# Patient Record
Sex: FEMALE | Race: WHITE | NOT HISPANIC OR LATINO | Employment: UNEMPLOYED | URBAN - METROPOLITAN AREA
[De-identification: names, ages, dates, MRNs, and addresses within clinical notes are randomized per-mention and may not be internally consistent; named-entity substitution may affect disease eponyms.]

---

## 2017-06-24 ENCOUNTER — OFFICE VISIT (OUTPATIENT)
Dept: URGENT CARE | Facility: MEDICAL CENTER | Age: 22
End: 2017-06-24

## 2017-06-24 PROCEDURE — G0382 LEV 3 HOSP TYPE B ED VISIT: HCPCS

## 2018-06-27 ENCOUNTER — APPOINTMENT (EMERGENCY)
Dept: ULTRASOUND IMAGING | Facility: HOSPITAL | Age: 23
End: 2018-06-27
Payer: COMMERCIAL

## 2018-06-27 ENCOUNTER — HOSPITAL ENCOUNTER (EMERGENCY)
Facility: HOSPITAL | Age: 23
Discharge: HOME | End: 2018-06-27
Attending: EMERGENCY MEDICINE
Payer: COMMERCIAL

## 2018-06-27 VITALS
OXYGEN SATURATION: 99 % | SYSTOLIC BLOOD PRESSURE: 119 MMHG | RESPIRATION RATE: 18 BRPM | TEMPERATURE: 97.8 F | HEART RATE: 76 BPM | WEIGHT: 150 LBS | DIASTOLIC BLOOD PRESSURE: 61 MMHG

## 2018-06-27 DIAGNOSIS — Z3A.20 20 WEEKS GESTATION OF PREGNANCY: Primary | ICD-10-CM

## 2018-06-27 PROCEDURE — 76857 US EXAM PELVIC LIMITED: CPT

## 2018-06-27 PROCEDURE — 99284 EMERGENCY DEPT VISIT MOD MDM: CPT

## 2018-06-27 NOTE — DISCHARGE INSTRUCTIONS
Pregnancy at 19 to 22 Weeks   None Listed: Practice bulletin no  175 summary: ultrasound in pregnancy  Obstet Gynecol 2016; 128(6):4077-6450  Martha Kulkarni T : Prenatal Care and Testing  In: Wing ALDRICH, ed  The 5 Minute Clinical Consult Standard 2015, 23rd ed  [address], 2014  [employer]  org: Your Baby's Development: The Second Trimester  [employer] [place] 2011  Available from URL: [email or web address]  printerview html  As accessed 2015-01-22  Kyle Carias VL : Prenatal Care  In: Merlin Juneau BY, Pelon RF, et al, eds  [facility] Obstetrics and Gynecology, 10th ed  [address], 2008 [employer]: Your pregnant body  March of [address] 2014  Available from URL: [email or web address]  [email or web address]  aspx  As accessed 2015-01-25  Nettina SM: Maternal and Fetal Health  In: Pedro Manual of Nursing Practice, 10th ed  [address], 2013  Yasmin Busch , Yaneth EW , & Ashlee Burciaga D : Preconception Counseling and Prenatal Care  In: Pavithra Archer MW, Natalie JL, et al, [facility] Gynecology and Obstetrics, 4th ed  [address], 2011  [facility] gov: Pregnancy  Office on [employer], [address] [facility] and [employer]  [place], [address]  Available from URL: Yokasta mar  html#a  As accessed 2015-01-25  [facility] gov: Pregnancy complications  Office on [facility] and [employer]  [place], [address]  Available from URL: Herbie montenegro  As accessed 2015-02-10    © 2017 [facility] Information is for End User's use only and may not be sold, redistributed or otherwise used for commercial purposes  All illustrations and images included in CareNotes® are the copyrighted property of A D A M , Inc  or Michael Smith  The above information is an  only  It is not intended as medical advice for individual conditions or treatments  Talk to your doctor, nurse or pharmacist before following any medical regimen to see if it is safe and effective for you  Pregnancy at 23 to 22 [address]:   Now that you are in your second trimester, you have more energy  You may also be feeling hungrier than usual  You may be gaining about ½ to 1 pound a week, and your pregnancy is beginning to show  You may need to start wearing maternity clothes  As your baby gets larger, you may have other symptoms  These may include body aches or stretch marks on your abdomen, breasts, thighs, or buttocks  DISCHARGE INSTRUCTIONS:   Seek care immediately if:   · You develop a severe headache that does not go away  · You have new or increased vision changes, such as blurred or spotted vision  · You have new or increased swelling in your face or hands  · You have vaginal spotting or bleeding  · Your water broke or you feel warm water gushing or trickling from your vagina  Contact your healthcare provider if:   · You have abdominal cramps, pressure, or tightening  · You have a change in vaginal discharge  · You cannot keep food or drinks down, and you are losing weight  · You have chills or a fever  · You have vaginal itching, burning, or pain  · You have yellow, green, white, or foul-smelling vaginal discharge  · You have pain or burning when you urinate, less urine than usual, or pink or bloody urine  · You have questions or concerns about your condition or care    How to care for yourself at this stage of your pregnancy:   · Eat a variety of healthy foods   Healthy foods include fruits, vegetables, whole-grain breads, low-fat dairy foods, beans, lean meats, and fish  Drink liquids as directed  Ask how much liquid to drink each day and which liquids are best for you  Limit caffeine to less than 200 milligrams each day  Limit your intake of fish to 2 servings each week  Choose fish low in mercury such as canned light tuna, shrimp, salmon, cod, or tilapia  Do not  eat fish high in mercury such as swordfish, tilefish, palmira mackerel, and shark  · Take prenatal vitamins as directed  Your need for certain vitamins and minerals, such as folic acid, increases during pregnancy  Prenatal vitamins provide some of the extra vitamins and minerals you need  Prenatal vitamins may also help to decrease the risk of certain birth defects  · Talk to your healthcare provider about exercise  Moderate exercise can help you stay fit  Your healthcare provider will help you plan an exercise program that is safe for you during pregnancy  · Do not smoke  If you smoke, it is never too late to quit  Smoking increases your risk of a miscarriage and other health problems during your pregnancy  Smoking can cause your baby to be born too early or weigh less at birth  Ask your healthcare provider for information if you need help quitting  · Do not drink alcohol  Alcohol passes from your body to your baby through the placenta  It can affect your baby's brain development and cause fetal alcohol syndrome (FAS)  FAS is a group of conditions that causes mental, behavior, and growth problems  · Talk to your healthcare provider before you take any medicines  Many medicines may harm your baby if you take them when you are pregnant  Do not take any medicines, vitamins, herbs, or supplements without first talking to your healthcare provider  Never use illegal or street drugs (such as marijuana or cocaine) while you are pregnant    Safety tips during pregnancy:   · Avoid hot tubs and saunas  Do not use a hot tub or sauna while you are pregnant, especially during your first trimester  Hot tubs and saunas may raise your baby's temperature and increase the risk of birth defects  · Avoid toxoplasmosis  This is an infection caused by eating raw meat or being around infected cat feces  It can cause birth defects, miscarriages, and other problems  Wash your hands after you touch raw meat  Make sure any meat is well-cooked before you eat it  Avoid raw eggs and unpasteurized milk  Use gloves or ask someone else to clean your cat's litter box while you are pregnant  Changes that are happening with your baby:  By 22 weeks, your baby is about 8 inches long from the top of the head to the rump (baby's bottom)  Your baby also weighs about 1 pound  Your baby is becoming much more active  You may be able to feel the baby move inside you now  The first movements may not be that noticeable  They may feel like a fluttering sensation  As time goes on, your baby's movements will become stronger and more noticeable  What you need to know about prenatal care:  During the first 28 weeks of your pregnancy, you will see your healthcare provider once a month  Your healthcare provider will check your blood pressure and weight  You may also need the following:  · A urine test  may also be done to check for sugar and protein  These can be signs of gestational diabetes or infection  Protein in your urine may also be a sign of preeclampsia  Preeclampsia is a condition that can develop during week 20 or later of your pregnancy  It causes high blood pressure, and it can cause problems with your kidneys and other organs  · Fundal height  is a measurement of your uterus to check your baby's growth  This number is usually the same as the number of weeks that you have been pregnant  · A fetal ultrasound  shows pictures of your baby inside your uterus  It shows your baby's development   The movement and position of your baby can also be seen  Your healthcare provider may be able to tell you what your baby's gender is during the ultrasound  · Your baby's heart rate  will be checked  © 2017 [address] Information is for End User's use only and may not be sold, redistributed or otherwise used for commercial purposes  All illustrations and images included in CareNotes® are the copyrighted property of A D A M , Inc  or Michael Smith  The above information is an  only  It is not intended as medical advice for individual conditions or treatments  Talk to your doctor, nurse or pharmacist before following any medical regimen to see if it is safe and effective for you

## 2018-06-27 NOTE — ED PROVIDER NOTES
History  Chief Complaint   Patient presents with    Abdominal Pain Pregnant     Pt here with lower abd pain  Estimating that she is about 4 months pregnant  Wants an US done so that her insurance can be approved but needs a due date  Denies any other symptoms  Patient presents to the emergency department for evaluation of lower abdominal pain with a history being 4 months pregnant  She has not had any pre needle care  She denies vaginal bleeding or discharge  Denies back pain  Denies contractions  Denies trauma fall or injury  Denies urinary symptoms  Denies fever nausea vomiting diarrhea  None       History reviewed  No pertinent past medical history  History reviewed  No pertinent surgical history  History reviewed  No pertinent family history  I have reviewed and agree with the history as documented  Social History   Substance Use Topics    Smoking status: Current Every Day Smoker    Smokeless tobacco: Never Used    Alcohol use No        Review of Systems   Constitutional: Negative  Negative for activity change, appetite change, chills, diaphoresis, fatigue and fever  HENT: Negative  Negative for congestion, drooling, sinus pain, sinus pressure, trouble swallowing and voice change  Eyes: Negative  Negative for photophobia and visual disturbance  Respiratory: Negative  Negative for cough, choking, chest tightness, shortness of breath and stridor  Cardiovascular: Negative  Negative for chest pain, palpitations and leg swelling  Gastrointestinal: Positive for abdominal pain  Negative for abdominal distention, anal bleeding, blood in stool, constipation, diarrhea, nausea and vomiting  Endocrine: Negative  Genitourinary: Negative  Negative for difficulty urinating, dyspareunia, dysuria, flank pain, frequency, hematuria, pelvic pain, urgency, vaginal bleeding, vaginal discharge and vaginal pain  Musculoskeletal: Negative    Negative for back pain, neck pain and neck stiffness  Skin: Negative  Negative for rash and wound  Allergic/Immunologic: Negative  Neurological: Negative  Negative for dizziness, tremors, seizures, syncope, facial asymmetry, speech difficulty, light-headedness, numbness and headaches  Hematological: Negative  Does not bruise/bleed easily  Psychiatric/Behavioral: Negative  Negative for agitation, behavioral problems, confusion, decreased concentration, self-injury, sleep disturbance and suicidal ideas  Physical Exam  Physical Exam   Constitutional: She is oriented to person, place, and time  She appears well-developed and well-nourished  Nontoxic appearance  No respiratory distress  Patient looks comfortable sitting upright in the stretcher  HENT:   Head: Normocephalic and atraumatic  Right Ear: External ear normal    Left Ear: External ear normal    Mouth/Throat: Oropharynx is clear and moist    Eyes: Conjunctivae and EOM are normal  Pupils are equal, round, and reactive to light  Neck: Normal range of motion  Neck supple  Cardiovascular: Normal rate, regular rhythm, normal heart sounds and intact distal pulses  Pulmonary/Chest: Effort normal and breath sounds normal  No respiratory distress  She has no wheezes  She has no rales  She exhibits no tenderness  Abdominal: Soft  Bowel sounds are normal  She exhibits no distension and no mass  There is no tenderness  There is no rebound and no guarding  No hernia  Protuberant abdomen consistent with intrauterine pregnancy  Musculoskeletal: Normal range of motion  She exhibits no edema, tenderness or deformity  Neurological: She is alert and oriented to person, place, and time  She has normal reflexes  She displays normal reflexes  No cranial nerve deficit or sensory deficit  She exhibits normal muscle tone  Coordination normal    Skin: Skin is warm and dry  Psychiatric: She has a normal mood and affect   Her behavior is normal  Judgment and thought content normal    Nursing note and vitals reviewed  Vital Signs  ED Triage Vitals [06/27/18 1633]   Temperature Pulse Respirations Blood Pressure SpO2   97 8 °F (36 6 °C) 76 18 119/61 99 %      Temp Source Heart Rate Source Patient Position - Orthostatic VS BP Location FiO2 (%)   Oral Monitor Sitting Right arm --      Pain Score       1           Vitals:    06/27/18 1633   BP: 119/61   Pulse: 76   Patient Position - Orthostatic VS: Sitting       Visual Acuity      ED Medications  Medications - No data to display    Diagnostic Studies  Results Reviewed     None                 US pelvis limited non OB   Final Result by Krissy Alvarado MD (06/27 1930)   1  Unremarkable appearance to the ovaries  2   Incomplete evaluation of intrauterine gestation  An OB US study may be ordered if there is clinical concern  Workstation performed: [id number]                    Procedures  Procedures       Phone Contacts  ED Phone Contact    ED Course  ED Course as of Jun 27 1932   Wed Jun 27, 2018   14[address] [facility] Patient is stable for discharge  So she has an ultrasound done in the emergency department which unofficially shows normal ovaries and a 20 week gestation with good fetal activity  IV she will be referred to private OBGYN                                 MDM  CritCare Time    Disposition  Final diagnoses:   20 weeks gestation of pregnancy     Time reflects when diagnosis was documented in both MDM as applicable and the Disposition within this note     Time User Action Codes Description Comment    6/27/2018  7:12 PM LopezKun bai Feeling Add [Z3A 20] 20 weeks gestation of pregnancy       ED Disposition     ED Disposition Condition Comment    Discharge  Jazmyneelisabet Yash discharge to home/self care      Condition at discharge: Stable        Follow-up Information     Follow up With Specialties Details [address] Obstetrics and Gynecology Schedule an appointment as soon as possible for a visit  [address] [address]  [phone]          Patient's Medications    No medications on file     No discharge procedures on file      ED Provider  Electronically Signed by           Candelario John MD  06/27/18 7031  [address]  06/27/18 4876

## 2018-08-18 ENCOUNTER — OFFICE VISIT (OUTPATIENT)
Dept: URGENT CARE | Facility: MEDICAL CENTER | Age: 23
End: 2018-08-18
Payer: COMMERCIAL

## 2018-08-18 VITALS
HEART RATE: 78 BPM | HEIGHT: 62 IN | RESPIRATION RATE: 18 BRPM | DIASTOLIC BLOOD PRESSURE: 58 MMHG | TEMPERATURE: 97.9 F | SYSTOLIC BLOOD PRESSURE: 122 MMHG | BODY MASS INDEX: 30.18 KG/M2 | OXYGEN SATURATION: 100 % | WEIGHT: 164 LBS

## 2018-08-18 DIAGNOSIS — J06.9 UPPER RESPIRATORY TRACT INFECTION, UNSPECIFIED TYPE: Primary | ICD-10-CM

## 2018-08-18 PROCEDURE — 99283 EMERGENCY DEPT VISIT LOW MDM: CPT | Performed by: PHYSICIAN ASSISTANT

## 2018-08-18 PROCEDURE — G0382 LEV 3 HOSP TYPE B ED VISIT: HCPCS | Performed by: PHYSICIAN ASSISTANT

## 2018-08-18 NOTE — PROGRESS NOTES
[facility]        NAME: Kyle Guidry is a 26 y o  female  : 1995    MRN: [id number]  DATE: 2018  TIME: 12:17 PM    Assessment and Plan   Upper respiratory tract infection, unspecified type [J06 9]  1  Upper respiratory tract infection, unspecified type           Patient Instructions     1  Tylenol as needed if febrile  2  Increase fluids  3  Nasal saline washes as needed for congestion  4  Follow up with PCP in 3-5 days if symptoms persist       Chief Complaint     Chief Complaint   Patient presents with    Cold Like Symptoms         History of Present Illness       The patient is a 72-year-old female presents with a 2 day history of nasal discharge, coughing congestion  She denies any fever but has had some chills since the onset of her symptoms  Patient denies any vomiting or diarrhea  Review of Systems   Review of Systems   Constitutional: Negative  HENT: Positive for congestion, postnasal drip and rhinorrhea  Respiratory: Positive for cough  Gastrointestinal: Negative  Current Medications       Current Outpatient Prescriptions:     Prenatal Multivit-Min-Fe-FA (PRENATAL VITAMINS PO), Take by mouth, Disp: , Rfl:     Current Allergies     Allergies as of 2018 - Reviewed 2018   Allergen Reaction Noted    Penicillins  2018            The following portions of the patient's history were reviewed and updated as appropriate: allergies, current medications, past family history, past medical history, past social history, past surgical history and problem list      No past medical history on file  No past surgical history on file  No family history on file  Medications have been verified          Objective   /58   Pulse 78   Temp 97 9 °F (36 6 °C) (Temporal)   Resp 18   Ht 5' 2" (1 575 m)   Wt 74 4 kg (164 lb)   LMP  (LMP Unknown)   SpO2 100%   BMI 30 00 kg/m²        Physical Exam     Physical Exam   Constitutional: She appears well-developed and well-nourished  No distress  HENT:   Head: Normocephalic and atraumatic  Right Ear: Tympanic membrane normal    Left Ear: Tympanic membrane normal    Nose: Rhinorrhea present  Mouth/Throat: Uvula is midline, oropharynx is clear and moist and mucous membranes are normal    Cardiovascular: Normal rate, regular rhythm and normal heart sounds  No murmur heard    Pulmonary/Chest: Effort normal and breath sounds normal

## 2018-08-18 NOTE — PATIENT INSTRUCTIONS
1  Tylenol as needed if febrile  2  Increase fluids  3  Nasal saline washes as needed for congestion  4   Follow up with PCP in 3-5 days if symptoms persist

## 2019-05-24 ENCOUNTER — OFFICE VISIT (OUTPATIENT)
Dept: URGENT CARE | Facility: MEDICAL CENTER | Age: 24
End: 2019-05-24
Payer: COMMERCIAL

## 2019-05-24 VITALS
WEIGHT: 190 LBS | HEART RATE: 93 BPM | DIASTOLIC BLOOD PRESSURE: 74 MMHG | TEMPERATURE: 98.2 F | OXYGEN SATURATION: 100 % | HEIGHT: 63 IN | BODY MASS INDEX: 33.66 KG/M2 | RESPIRATION RATE: 18 BRPM | SYSTOLIC BLOOD PRESSURE: 109 MMHG

## 2019-05-24 DIAGNOSIS — R21 RASH: Primary | ICD-10-CM

## 2019-05-24 PROCEDURE — 99283 EMERGENCY DEPT VISIT LOW MDM: CPT | Performed by: PHYSICIAN ASSISTANT

## 2019-05-24 PROCEDURE — 99213 OFFICE O/P EST LOW 20 MIN: CPT | Performed by: PHYSICIAN ASSISTANT

## 2019-05-24 PROCEDURE — G0382 LEV 3 HOSP TYPE B ED VISIT: HCPCS | Performed by: PHYSICIAN ASSISTANT

## 2019-05-24 RX ORDER — PREDNISONE 20 MG/1
40 TABLET ORAL DAILY
Qty: 10 TABLET | Refills: 0 | Status: SHIPPED | OUTPATIENT
Start: 2019-05-24 | End: 2019-05-29

## 2019-08-06 ENCOUNTER — OFFICE VISIT (OUTPATIENT)
Dept: URGENT CARE | Facility: MEDICAL CENTER | Age: 24
End: 2019-08-06
Payer: COMMERCIAL

## 2019-08-06 VITALS
WEIGHT: 199 LBS | OXYGEN SATURATION: 98 % | SYSTOLIC BLOOD PRESSURE: 122 MMHG | RESPIRATION RATE: 18 BRPM | HEART RATE: 80 BPM | TEMPERATURE: 96.8 F | BODY MASS INDEX: 36.62 KG/M2 | HEIGHT: 62 IN | DIASTOLIC BLOOD PRESSURE: 78 MMHG

## 2019-08-06 DIAGNOSIS — R21 SKIN RASH: Primary | ICD-10-CM

## 2019-08-06 PROCEDURE — 99283 EMERGENCY DEPT VISIT LOW MDM: CPT | Performed by: PHYSICIAN ASSISTANT

## 2019-08-06 PROCEDURE — G0382 LEV 3 HOSP TYPE B ED VISIT: HCPCS | Performed by: PHYSICIAN ASSISTANT

## 2019-08-06 PROCEDURE — 99213 OFFICE O/P EST LOW 20 MIN: CPT | Performed by: PHYSICIAN ASSISTANT

## 2019-08-06 RX ORDER — CLOTRIMAZOLE AND BETAMETHASONE DIPROPIONATE 10; .64 MG/G; MG/G
CREAM TOPICAL 2 TIMES DAILY
Qty: 30 G | Refills: 0 | Status: SHIPPED | OUTPATIENT
Start: 2019-08-06 | End: 2022-07-11

## 2019-08-06 RX ORDER — PREDNISONE 10 MG/1
TABLET ORAL
Qty: 30 TABLET | Refills: 0 | Status: SHIPPED | OUTPATIENT
Start: 2019-08-06 | End: 2019-08-16

## 2019-08-06 NOTE — PROGRESS NOTES
[email or web address]        NAME: Rafael Zavala is a 27 y o  female  : 1995    MRN: [id number]  DATE: 2019  TIME: 10:53 AM    Assessment and Plan   Skin rash [R21]  1  Skin rash  clotrimazole-betamethasone (LOTRISONE) 1-0 05 % cream    predniSONE 10 mg tablet         Patient Instructions     Discussed condition with patient  She has an inflammatory appearing rash but given its appearance and the fact that she has had for least 2 months, there may be a fungal component  It does not appear bacterial   I will treat her with a combination of an oral prednisone taper as well as Lotrisone cream   If not significantly improved over the next 1-2 weeks, then she should be re-evaluated  Follow up with PCP in 3-5 days  Proceed to  ER if symptoms worsen  Chief Complaint     Chief Complaint   Patient presents with    Rash     started 2 months ago with a rash , itching and irritation has resolved but still visible (denies any other sx)         History of Present Illness       Patient presents which she reports is a 2 month history of a diffuse patchy rash which at this point is persistent but not symptomatic  When it first emerged, she was having some itching and irritation  She has tried with over-the-counter topical preparations without success  Denies changing any foods, meds, personal skin care products, detergents  She is otherwise feeling well without any other symptoms or complaints  Review of Systems   Review of Systems   Constitutional: Negative  Respiratory: Negative  Cardiovascular: Negative  Gastrointestinal: Negative  Genitourinary: Negative  Skin: Positive for rash  Current Medications       Current Outpatient Medications:     clotrimazole-betamethasone (LOTRISONE) 1-0 05 % cream, Apply topically 2 (two) times a day, Disp: 30 g, Rfl: 0    predniSONE 10 mg tablet, 2-9-7-4-3-3-2-2-1-1 taper with food  , Disp: 30 tablet, Rfl: 0    Prenatal Multivit-Min-Fe-FA (PRENATAL VITAMINS PO), Take by mouth, Disp: , Rfl:     Current Allergies     Allergies as of 2019 - Reviewed 2019   Allergen Reaction Noted    Penicillins  2018            The following portions of the patient's history were reviewed and updated as appropriate: allergies, current medications, past family history, past medical history, past social history, past surgical history and problem list      History reviewed  No pertinent past medical history  Past Surgical History:   Procedure Laterality Date     SECTION         Family History   Problem Relation Age of Onset    No Known Problems Mother     No Known Problems Father          Medications have been verified  Objective   /78   Pulse 80   Temp (!) 96 8 °F (36 °C) (Temporal)   Resp 18   Ht 5' 2" (1 575 m)   Wt 90 3 kg (199 lb)   SpO2 98%   BMI 36 40 kg/m²        Physical Exam     Physical Exam   Constitutional: She is oriented to person, place, and time  She appears well-developed and well-nourished  No distress  Neurological: She is alert and oriented to person, place, and time  Skin: Rash (Diffuse scattered patchy mildly erythematous scaly maculopapular rash in a nonspecific distribution  Appears inflammatory) noted  Psychiatric: She has a normal mood and affect  Vitals reviewed

## 2019-08-06 NOTE — PATIENT INSTRUCTIONS
Acute Rash   WHAT YOU NEED TO KNOW:   A rash is irritation, redness, or itchiness in the skin or mucus membranes  Mucus membranes are areas such as the lining of your nose or throat  Acute means the rash starts suddenly, worsens quickly, and lasts a short time  An acute rash may be caused by a disease, such as hepatitis or vasculitis  The rash may be a reaction to something you are allergic to, such as certain foods, or latex  Certain medicines, including antibiotics, NSAIDs, prescription pain medicine, and aspirin can also cause a rash  DISCHARGE INSTRUCTIONS:   Return to the emergency department if:   · You have sudden trouble breathing or chest pain  · You are vomiting, have a headache or muscle aches, and your throat hurts  Contact your healthcare provider if:   · You have a fever  · You get open wounds from scratching your skin, or you have a wound that is red, swollen, or painful  · Your rash lasts longer than 3 months  · You have swelling or pain in your joints  · You have questions or concerns about your condition or care  Medicines:  If your rash does not go away on its own, you may need the following medicines:  · Antihistamines  may be given to help decrease itching  · Steroids  may be given to decrease inflammation  · Antibiotics  help fight or prevent a bacterial infection  · Take your medicine as directed  Contact your healthcare provider if you think your medicine is not helping or if you have side effects  Tell him of her if you are allergic to any medicine  Keep a list of the medicines, vitamins, and herbs you take  Include the amounts, and when and why you take them  Bring the list or the pill bottles to follow-up visits  Carry your medicine list with you in case of an emergency  Prevent a rash or care for your skin when you have a rash:  Dry skin can lead to more problems  Do not scratch your skin if it itches  You may cause a skin infection by scratching   The following may prevent dry skin, and help your skin look better:  · Use thick cream lotions or petroleum jelly to help soothe your rash  These products work well on areas with thick skin, such as your feet  Cool compresses may also be used to soothe your skin  Apply a cool compress or a cool, wet towel, and then cover it with a dry towel  · Use lukewarm water when you bathe  Hot water may damage your skin more  Pat your skin dry  Do not rub your skin with a towel  · Use detergents, soaps, shampoos, and bubble baths made for sensitive skin  Wear clothes that are made of cotton instead of nylon or wool  Cotton is softer, so it will not hurt your skin as much  Follow up with your healthcare provider as directed: You may need to see a dermatologist if healthcare providers do not know what is causing your rash  You may also need to see a dermatologist if your rash does not get better even with treatment  You may need to see a dietitian if you have allergies to foods  Write down your questions so you remember to ask them during your visits  © 2017 [address] [facility] Information is for End User's use only and may not be sold, redistributed or otherwise used for commercial purposes  All illustrations and images included in CareNotes® are the copyrighted property of A D A [employer]  or Michael Smith  The above information is an  only  It is not intended as medical advice for individual conditions or treatments  Talk to your doctor, nurse or pharmacist before following any medical regimen to see if it is safe and effective for you

## 2022-07-11 ENCOUNTER — TELEPHONE (OUTPATIENT)
Dept: OBGYN CLINIC | Facility: CLINIC | Age: 27
End: 2022-07-11

## 2022-07-11 ENCOUNTER — OFFICE VISIT (OUTPATIENT)
Dept: OBGYN CLINIC | Facility: CLINIC | Age: 27
End: 2022-07-11
Payer: COMMERCIAL

## 2022-07-11 VITALS
SYSTOLIC BLOOD PRESSURE: 118 MMHG | HEIGHT: 62 IN | DIASTOLIC BLOOD PRESSURE: 76 MMHG | WEIGHT: 197 LBS | BODY MASS INDEX: 36.25 KG/M2

## 2022-07-11 DIAGNOSIS — N93.9 ABNORMAL UTERINE BLEEDING: Primary | ICD-10-CM

## 2022-07-11 PROCEDURE — 99203 OFFICE O/P NEW LOW 30 MIN: CPT | Performed by: OBSTETRICS & GYNECOLOGY

## 2022-07-11 RX ORDER — NORETHINDRONE ACETATE AND ETHINYL ESTRADIOL 1; .02 MG/1; MG/1
1 TABLET ORAL DAILY
Qty: 84 TABLET | Refills: 4 | Status: SHIPPED | OUTPATIENT
Start: 2022-07-11

## 2022-07-11 RX ORDER — NORETHINDRONE ACETATE/ETHINYL ESTRADIOL AND FERROUS FUMARATE 1MG-20(24)
1 KIT ORAL DAILY
Qty: 84 TABLET | Refills: 3 | Status: SHIPPED | OUTPATIENT
Start: 2022-07-11

## 2022-07-11 NOTE — PROGRESS NOTES
Assessment/Plan:  26-year-old  with abnormal uterine bleeding, likely anovulatory  Reviewed that the treatment of irregular bleeding especially if anovulatory is through hormonal treatment  Reviewed OCPs, NuvaRing, Nexplanon, Depo-Provera and IUDs  Reviewed the risks and benefits and most common side effects of all these  At this point patient will start a birth control pill  She has no absolute contraindications  Reviewed how to take the pill and what to do if she misses a pill  She can also do a course of Motrin to help improve the bleeding  Will do TSH CBC  If bleeding does not improve 2 pelvic ultrasound  Will return in 3 months for her annual exam      Abnormal uterine bleeding  -     norethindrone-ethinyl estradiol-ferrous fumarate (Shubham 24 FE) 1-20 MG-MCG(24) per tablet; Take 1 tablet by mouth daily  -     TSH, 3rd generation with Free T4 reflex; Future  -     CBC; Future        Subjective:      Patient ID: Berta Whittaker is a 24 y o  female  HPI  26y  presents to the office with complaints of 1 month of heavy vaginal bleeding  Menarche was at age 17  She has always had irregular periods  She frequently skipped 1-3 months  Her bleeding normally is light  She has been having cramping  She has been passing clots  She denies being dizzy or lightheaded  She has never been on birth control  She is not currently sexually active  It is been several years since she seen a gyn  The following portions of the patient's history were reviewed and updated as appropriate: allergies, current medications, past family history, past medical history, past social history, past surgical history and problem list     Review of Systems   Constitutional: Negative  HENT: Negative  Respiratory: Negative  Cardiovascular: Negative  Gastrointestinal: Negative  Genitourinary: Positive for pelvic pain and vaginal bleeding  Negative for vaginal discharge  Neurological: Negative  Psychiatric/Behavioral: Negative  Objective:      /76   Ht 5' 2" (1 575 m)   Wt 89 4 kg (197 lb)   LMP 06/11/2022 (Exact Date)   Breastfeeding No   BMI 36 03 kg/m²          Physical Exam  Vitals and nursing note reviewed  Constitutional:       Appearance: Normal appearance  HENT:      Head: Normocephalic and atraumatic  Eyes:      Extraocular Movements: Extraocular movements intact  Conjunctiva/sclera: Conjunctivae normal       Pupils: Pupils are equal, round, and reactive to light  Pulmonary:      Effort: Pulmonary effort is normal    Genitourinary:     Labia:         Right: No rash, tenderness, lesion or injury  Left: No rash, tenderness, lesion or injury  Vagina: Bleeding present  No tenderness or lesions  Cervix: Normal       Uterus: Normal        Adnexa: Right adnexa normal and left adnexa normal    Skin:     General: Skin is warm and dry  Neurological:      General: No focal deficit present  Mental Status: She is alert and oriented to person, place, and time     Psychiatric:         Mood and Affect: Mood normal          Behavior: Behavior normal

## 2022-07-11 NOTE — TELEPHONE ENCOUNTER
Patient called stating that this OCP's isn't covered by her insurance   She would like something else sent    I believe [facility] isn't covering anything that is Fe

## 2022-12-29 ENCOUNTER — OFFICE VISIT (OUTPATIENT)
Dept: URGENT CARE | Facility: MEDICAL CENTER | Age: 27
End: 2022-12-29

## 2022-12-29 VITALS
HEIGHT: 62 IN | OXYGEN SATURATION: 100 % | HEART RATE: 98 BPM | BODY MASS INDEX: 36.8 KG/M2 | RESPIRATION RATE: 18 BRPM | TEMPERATURE: 97 F | WEIGHT: 200 LBS

## 2022-12-29 DIAGNOSIS — R05.1 ACUTE COUGH: Primary | ICD-10-CM

## 2022-12-29 DIAGNOSIS — J05.0 CROUP: ICD-10-CM

## 2022-12-29 RX ORDER — PREDNISONE 10 MG/1
TABLET ORAL
Qty: 20 TABLET | Refills: 0 | Status: SHIPPED | OUTPATIENT
Start: 2022-12-29

## 2022-12-29 RX ORDER — PREDNISOLONE SODIUM PHOSPHATE 15 MG/5ML
15 SOLUTION ORAL EVERY 12 HOURS
Qty: 40 ML | Refills: 0 | Status: SHIPPED | OUTPATIENT
Start: 2022-12-29 | End: 2022-12-29

## 2022-12-29 NOTE — PATIENT INSTRUCTIONS
Croup   WHAT YOU NEED TO KNOW:   Croup is a respiratory infection  It causes your throat and upper airways to swell and narrow  It is also called laryngotracheobronchitis  Croup is more common in children, but adults can also get it  DISCHARGE INSTRUCTIONS:   Call your local emergency number (911 in the [address] [facility]) if:   You have severe difficulty breathing  Return to the emergency department if:   Your fingertips or the skin around your mouth turns blue  You cannot swallow your spit and begin to drool  You are severely fatigued (mentally and physically tired)  Call your doctor if:   Your symptoms do not get better or get worse  You have questions or concerns about your condition or care  Medicines: You may need any of the following:  A cough suppressant  is cough medicine that decreases your urge to cough  Your healthcare provider may suggest that you take a cough suppressant at night so you can rest     Steroids  help decrease inflammation and open your airway  Acetaminophen  decreases pain and fever  It is available without a doctor's order  Ask how much to take and how often to take it  Follow directions  Read the labels of all other medicines you are using to see if they also contain acetaminophen, or ask your doctor or pharmacist  Acetaminophen can cause liver damage if not taken correctly  Do not use more than 4 grams (4,000 milligrams) total of acetaminophen in one day  NSAIDs , such as ibuprofen, help decrease swelling, pain, and fever  This medicine is available with or without a doctor's order  NSAIDs can cause stomach bleeding or kidney problems in certain people  If you take blood thinner medicine, always ask your healthcare provider if NSAIDs are safe for you  Always read the medicine label and follow directions  Take your medicine as directed  Contact your healthcare provider if you think your medicine is not helping or if you have side effects   Tell him or her if you are allergic to any medicine  Keep a list of the medicines, vitamins, and herbs you take  Include the amounts, and when and why you take them  Bring the list or the pill bottles to follow-up visits  Carry your medicine list with you in case of an emergency  Manage your symptoms:   Rest and keep calm  as much as possible  Stress hormones can make your cough worse  Sit in a steam-filled bathroom  Turn the shower on  Close the door and sit in the bathroom for about 15 to 20 minutes  Do not get into the shower  Use a humidifier or vaporizer  next to your bed to help decrease your cough at night  Drink warm liquids  Warm liquids will soothe your throat and help with your cough  Prevent the spread of croup:       Wash your hands often with soap and water  Carry germ-killing hand lotion or gel with you  You can use the lotion or gel to clean your hands when soap and water are not available  Cover your mouth when you sneeze or cough  Sneeze and cough into a tissue or the bend of your arm  If you use a tissue, throw it away immediately and wash your hands  Do not share  cups, silverware, or dishes with others  Stay home  if you are sick  Follow up with your doctor as directed:  Write down your questions so you remember to ask them during your visits  © Copyright [employer] 2022 Information is for End User's use only and may not be sold, redistributed or otherwise used for commercial purposes  All illustrations and images included in CareNotes® are the copyrighted property of A D A M , Inc  or [employer]  The above information is an  only  It is not intended as medical advice for individual conditions or treatments  Talk to your doctor, nurse or pharmacist before following any medical regimen to see if it is safe and effective for you

## 2022-12-29 NOTE — PROGRESS NOTES
[facility]        NAME: Dimple Pinzon is a 24 y o  female  : 1995    MRN: [id number]  DATE: 2022  TIME: 6:16 PM    Assessment and Plan   Acute cough [R05 1]  1  Acute cough  Covid/Flu-Office Collect      2  Croup  predniSONE 10 mg tablet    DISCONTINUED: prednisoLONE (ORAPRED) 15 mg/5 mL oral solution            Patient Instructions       Follow up with PCP in 3-5 days  Proceed to  ER if symptoms worsen  Chief Complaint     Chief Complaint   Patient presents with   • Cough     Cough and runny nose for the past 2 days   No fever  History of Present Illness       Cough  This is a new problem  The current episode started in the past 7 days  The problem has been waxing and waning  The problem occurs every few hours  The cough is non-productive  Associated symptoms include postnasal drip and rhinorrhea  Pertinent negatives include no chills, ear congestion, fever, headaches, myalgias, sore throat, shortness of breath or wheezing  Nothing aggravates the symptoms  She has tried nothing for the symptoms  There is no history of asthma or COPD  Tobacco user       Review of Systems   Review of Systems   Constitutional: Negative for chills and fever  HENT: Positive for postnasal drip and rhinorrhea  Negative for sore throat  Respiratory: Positive for cough  Negative for shortness of breath and wheezing  Musculoskeletal: Negative for myalgias  Neurological: Negative for headaches  Current Medications       Current Outpatient Medications:   •  norethindrone-ethinyl estradiol-ferrous fumarate (Shubham 24 FE) 1-20 MG-MCG(24) per tablet, Take 1 tablet by mouth daily, Disp: 84 tablet, Rfl: 3  •  predniSONE 10 mg tablet, Take 4 pills PO once in the morning for first 2 days  3 Pills PO for next 2 days, 2 pills PO for next 2 days, 1 pill PO for next 2 days  , Disp: 20 tablet, Rfl: 0  •  norethindrone-ethinyl estradiol (Loestrin , ,) 1-20 MG-MCG per tablet, Take 1 tablet by mouth daily, Disp: 84 tablet, Rfl: 4    Current Allergies     Allergies as of 2022 - Reviewed 2022   Allergen Reaction Noted   • Penicillins  2018            The following portions of the patient's history were reviewed and updated as appropriate: allergies, current medications, past family history, past medical history, past social history, past surgical history and problem list      History reviewed  No pertinent past medical history  Past Surgical History:   Procedure Laterality Date   •  SECTION         Family History   Problem Relation Age of Onset   • Liver cancer Mother    • No Known Problems Father    • No Known Problems Sister    • No Known Problems Sister    • No Known Problems Brother    • Early death Brother         MVA   • No Known Problems Son    • Liver cancer Maternal Grandmother    • No Known Problems Maternal Grandfather    • No Known Problems Paternal Grandmother    • No Known Problems Paternal Grandfather          Medications have been verified  Objective   Pulse 98   Temp (!) 97 °F (36 1 °C)   Resp 18   Ht 5' 2" (1 575 m)   Wt 90 7 kg (200 lb)   SpO2 100%   BMI 36 58 kg/m²        Physical Exam     Physical Exam  Vitals and nursing note reviewed  Constitutional:       General: She is not in acute distress  Appearance: Normal appearance  She is well-developed  She is not ill-appearing  HENT:      Head: Normocephalic and atraumatic  Right Ear: Tympanic membrane, ear canal and external ear normal       Left Ear: Tympanic membrane, ear canal and external ear normal       Nose: Congestion and rhinorrhea present  Mouth/Throat:      Mouth: Mucous membranes are moist  No oral lesions  Pharynx: No oropharyngeal exudate or posterior oropharyngeal erythema  Eyes:      Extraocular Movements: Extraocular movements intact  Right eye: Normal extraocular motion  Left eye: Normal extraocular motion        Conjunctiva/sclera: Conjunctivae normal       Pupils: Pupils are equal, round, and reactive to light  Cardiovascular:      Rate and Rhythm: Normal rate and regular rhythm  Pulses: Normal pulses  Heart sounds: Normal heart sounds  Pulmonary:      Effort: Pulmonary effort is normal  No respiratory distress  Breath sounds: Rhonchi present  No wheezing  Musculoskeletal:         General: Normal range of motion  Cervical back: Normal range of motion and neck supple  No tenderness  Lymphadenopathy:      Cervical: No cervical adenopathy  Skin:     General: Skin is warm and dry  Capillary Refill: Capillary refill takes less than 2 seconds  Findings: No lesion or rash  Neurological:      General: No focal deficit present  Mental Status: She is alert and oriented to person, place, and time     Psychiatric:         Mood and Affect: Mood normal          Behavior: Behavior normal

## 2022-12-30 ENCOUNTER — TELEPHONE (OUTPATIENT)
Dept: URGENT CARE | Facility: CLINIC | Age: 27
End: 2022-12-30

## 2022-12-30 LAB
FLUAV RNA RESP QL NAA+PROBE: POSITIVE
FLUBV RNA RESP QL NAA+PROBE: NEGATIVE
SARS-COV-2 RNA RESP QL NAA+PROBE: NEGATIVE

## 2022-12-30 NOTE — TELEPHONE ENCOUNTER
Patient was called and informed that her influenza  A PCR is positive  Explained that during her initial encounter she was already 5 to 7 days into her symptoms and so was not a candidate for Tamiflu  However, to continue the prednisone for her cough  She was also informed to follow-up with her primary care doctor if her symptoms worsen   Patient expressed verbal understanding to these instructions

## 2023-05-30 ENCOUNTER — OFFICE VISIT (OUTPATIENT)
Dept: URGENT CARE | Facility: MEDICAL CENTER | Age: 28
End: 2023-05-30

## 2023-05-30 VITALS
DIASTOLIC BLOOD PRESSURE: 78 MMHG | SYSTOLIC BLOOD PRESSURE: 122 MMHG | BODY MASS INDEX: 36.62 KG/M2 | TEMPERATURE: 98 F | HEIGHT: 62 IN | WEIGHT: 199 LBS | RESPIRATION RATE: 18 BRPM | OXYGEN SATURATION: 98 % | HEART RATE: 80 BPM

## 2023-05-30 DIAGNOSIS — J02.9 ACUTE PHARYNGITIS, UNSPECIFIED ETIOLOGY: Primary | ICD-10-CM

## 2023-05-30 LAB — S PYO AG THROAT QL: NEGATIVE

## 2023-05-30 NOTE — PROGRESS NOTES
[facility]        NAME: Yonny Gtz is a 24 y o  female  : 1995    MRN: [id number]  DATE: May 30, 2023  TIME: 6:59 PM    Assessment and Plan   Acute pharyngitis, unspecified etiology [J02 9]  1  Acute pharyngitis, unspecified etiology              Patient Instructions     Pharyngitis  Salt water gargles  Follow up with PCP in 3-5 days  Proceed to  ER if symptoms worsen  Chief Complaint     Chief Complaint   Patient presents with   • Sore Throat     Pt reports having sore throat, losing her voice x 2 days  History of Present Illness       57-year-old female who presents complaining of sore throat and swelling glands x2 days  Patient denies fevers, chills, chest pain, shortness of breath  Review of Systems   Review of Systems   Constitutional: Negative for activity change, appetite change, chills, diaphoresis, fatigue and fever  HENT: Positive for sore throat  Negative for congestion, ear discharge, ear pain, facial swelling, hearing loss, mouth sores, nosebleeds, postnasal drip, rhinorrhea, sinus pressure, sinus pain, sneezing and voice change  Respiratory: Negative for apnea, cough, choking, chest tightness, shortness of breath, wheezing and stridor  Cardiovascular: Negative  Current Medications       Current Outpatient Medications:   •  norethindrone-ethinyl estradiol (Loestrin , ,) 1-20 MG-MCG per tablet, Take 1 tablet by mouth daily (Patient not taking: Reported on 2023), Disp: 84 tablet, Rfl: 4  •  norethindrone-ethinyl estradiol-ferrous fumarate (Shubham 24 FE) 1-20 MG-MCG(24) per tablet, Take 1 tablet by mouth daily (Patient not taking: Reported on 2023), Disp: 84 tablet, Rfl: 3  •  predniSONE 10 mg tablet, Take 4 pills PO once in the morning for first 2 days  3 Pills PO for next 2 days, 2 pills PO for next 2 days, 1 pill PO for next 2 days   (Patient not taking: Reported on 2023), Disp: 20 tablet, Rfl: 0    Current Allergies "    Allergies as of 2023 - Reviewed 2023   Allergen Reaction Noted   • Penicillins  2018            The following portions of the patient's history were reviewed and updated as appropriate: allergies, current medications, past family history, past medical history, past social history, past surgical history and problem list      History reviewed  No pertinent past medical history  Past Surgical History:   Procedure Laterality Date   •  SECTION         Family History   Problem Relation Age of Onset   • Liver cancer Mother    • No Known Problems Father    • No Known Problems Sister    • No Known Problems Sister    • No Known Problems Brother    • Early death Brother         MVA   • No Known Problems Son    • Liver cancer Maternal Grandmother    • No Known Problems Maternal Grandfather    • No Known Problems Paternal Grandmother    • No Known Problems Paternal Grandfather          Medications have been verified  Objective   /78 (BP Location: Right arm)   Pulse 80   Temp 98 °F (36 7 °C)   Resp 18   Ht 5' 2\" (1 575 m)   Wt 90 3 kg (199 lb)   LMP 2023   SpO2 98%   BMI 36 40 kg/m²        Physical Exam     Physical Exam  Constitutional:       General: She is not in acute distress  Appearance: She is well-developed  She is not diaphoretic  HENT:      Head: Normocephalic and atraumatic  Jaw: No trismus  Right Ear: Hearing, tympanic membrane, ear canal and external ear normal       Left Ear: Hearing, tympanic membrane, ear canal and external ear normal       Mouth/Throat:      Pharynx: Uvula midline  Posterior oropharyngeal erythema present  No oropharyngeal exudate or uvula swelling  Tonsils: No tonsillar abscesses  Cardiovascular:      Rate and Rhythm: Normal rate and regular rhythm  Heart sounds: Normal heart sounds  Pulmonary:      Effort: Pulmonary effort is normal  No respiratory distress  Breath sounds: Normal breath sounds   No " stridor  No wheezing, rhonchi or rales  Chest:      Chest wall: No tenderness  Musculoskeletal:      Cervical back: Normal range of motion and neck supple  Lymphadenopathy:      Cervical: Cervical adenopathy present

## 2023-06-01 LAB — BACTERIA THROAT CULT: NORMAL

## 2023-06-06 ENCOUNTER — OFFICE VISIT (OUTPATIENT)
Dept: URGENT CARE | Facility: MEDICAL CENTER | Age: 28
End: 2023-06-06
Payer: COMMERCIAL

## 2023-06-06 VITALS
HEIGHT: 62 IN | HEART RATE: 83 BPM | DIASTOLIC BLOOD PRESSURE: 85 MMHG | OXYGEN SATURATION: 98 % | SYSTOLIC BLOOD PRESSURE: 129 MMHG | TEMPERATURE: 98.9 F | WEIGHT: 199 LBS | RESPIRATION RATE: 22 BRPM | BODY MASS INDEX: 36.62 KG/M2

## 2023-06-06 DIAGNOSIS — J06.9 UPPER RESPIRATORY TRACT INFECTION, UNSPECIFIED TYPE: Primary | ICD-10-CM

## 2023-06-06 LAB
SARS-COV-2 AG UPPER RESP QL IA: NEGATIVE
VALID CONTROL: NORMAL

## 2023-06-06 PROCEDURE — 87811 SARS-COV-2 COVID19 W/OPTIC: CPT | Performed by: PHYSICIAN ASSISTANT

## 2023-06-06 PROCEDURE — 99213 OFFICE O/P EST LOW 20 MIN: CPT | Performed by: PHYSICIAN ASSISTANT

## 2023-06-06 RX ORDER — BENZONATATE 200 MG/1
200 CAPSULE ORAL 3 TIMES DAILY PRN
Qty: 20 CAPSULE | Refills: 0 | Status: SHIPPED | OUTPATIENT
Start: 2023-06-06

## 2023-06-06 NOTE — PROGRESS NOTES
[facility]        NAME: Adore Hercules is a 34 y o  female  : 1995    MRN: [id number]  DATE: 2023  TIME: 3:08 PM    Assessment and Plan   Upper respiratory tract infection, unspecified type [J06 9]  1  Upper respiratory tract infection, unspecified type  Poct Covid 19 Rapid Antigen Test    benzonatate (TESSALON) 200 MG capsule            Patient Instructions     1  Increase fluids  2  Motrin as needed for fever  3  Take Tessalon 200mg  Every 8 hours as needed for cough  4  Follow up with PCP in 3-5 days if symptoms persist       Chief Complaint     Chief Complaint   Patient presents with   • Cough     Pt reports has a cough started a week ago but is getting worse          History of Present Illness       Grey Sumner is a 32-year-old female who presents with a 1 week history of nasal discharge, cough and congestion  Patient reports no fever, chills, body aches shortness of breath or vomiting since the onset of her symptoms  She denies any known sick contacts but is concerned about a possible COVID infection  Review of Systems   Review of Systems   Constitutional: Negative  HENT: Positive for congestion, postnasal drip and rhinorrhea  Respiratory: Positive for cough  Gastrointestinal: Negative            Current Medications       Current Outpatient Medications:   •  benzonatate (TESSALON) 200 MG capsule, Take 1 capsule (200 mg total) by mouth 3 (three) times a day as needed for cough, Disp: 20 capsule, Rfl: 0  •  al mag oxide-diphenhydramine-lidocaine viscous (MAGIC MOUTHWASH) 1:1:1 suspension, Swish and spit 10 mL every 4 (four) hours as needed for mouth pain or discomfort, Disp: 90 mL, Rfl: 0  •  norethindrone-ethinyl estradiol (Loestrin /, 21,) 1-20 MG-MCG per tablet, Take 1 tablet by mouth daily (Patient not taking: Reported on 2023), Disp: 84 tablet, Rfl: 4  •  norethindrone-ethinyl estradiol-ferrous fumarate (Shubham 24 FE) 1-20 MG-MCG(24) per tablet, Take 1 tablet by "mouth daily (Patient not taking: Reported on 2023), Disp: 84 tablet, Rfl: 3  •  predniSONE 10 mg tablet, Take 4 pills PO once in the morning for first 2 days  3 Pills PO for next 2 days, 2 pills PO for next 2 days, 1 pill PO for next 2 days  (Patient not taking: Reported on 2023), Disp: 20 tablet, Rfl: 0    Current Allergies     Allergies as of 2023 - Reviewed 2023   Allergen Reaction Noted   • Penicillins  2018            The following portions of the patient's history were reviewed and updated as appropriate: allergies, current medications, past family history, past medical history, past social history, past surgical history and problem list      No past medical history on file  Past Surgical History:   Procedure Laterality Date   •  SECTION         Family History   Problem Relation Age of Onset   • Liver cancer Mother    • No Known Problems Father    • No Known Problems Sister    • No Known Problems Sister    • No Known Problems Brother    • Early death Brother         MVA   • No Known Problems Son    • Liver cancer Maternal Grandmother    • No Known Problems Maternal Grandfather    • No Known Problems Paternal Grandmother    • No Known Problems Paternal Grandfather          Medications have been verified  Objective   /85   Pulse 83   Temp 98 9 °F (37 2 °C)   Resp 22   Ht 5' 2\" (1 575 m)   Wt 90 3 kg (199 lb)   LMP 2023   SpO2 98%   BMI 36 40 kg/m²   Patient's last menstrual period was 2023  Physical Exam     Physical Exam  Constitutional:       General: She is not in acute distress  Appearance: Normal appearance  She is not ill-appearing  HENT:      Head: Normocephalic and atraumatic  Right Ear: Tympanic membrane and ear canal normal       Left Ear: Tympanic membrane and ear canal normal       Nose: Mucosal edema, congestion and rhinorrhea present  Rhinorrhea is clear  Mouth/Throat:      Lips: Pink        Pharynx: " Oropharynx is clear  Cardiovascular:      Rate and Rhythm: Normal rate and regular rhythm  Heart sounds: Normal heart sounds, S1 normal and S2 normal  No murmur heard  Pulmonary:      Effort: Pulmonary effort is normal       Breath sounds: Normal breath sounds and air entry  No wheezing, rhonchi or rales  Neurological:      Mental Status: She is alert

## 2023-06-06 NOTE — PATIENT INSTRUCTIONS
1  Increase fluids  2  Motrin as needed for fever  3  Take Tessalon 200mg  Every 8 hours as needed for cough  4   Follow up with PCP in 3-5 days if symptoms persist

## 2023-09-22 ENCOUNTER — OFFICE VISIT (OUTPATIENT)
Dept: OBGYN CLINIC | Facility: CLINIC | Age: 28
End: 2023-09-22
Payer: COMMERCIAL

## 2023-09-22 VITALS
BODY MASS INDEX: 36.95 KG/M2 | DIASTOLIC BLOOD PRESSURE: 58 MMHG | HEIGHT: 62 IN | SYSTOLIC BLOOD PRESSURE: 98 MMHG | WEIGHT: 200.8 LBS

## 2023-09-22 DIAGNOSIS — Z11.3 SCREENING FOR STD (SEXUALLY TRANSMITTED DISEASE): Primary | ICD-10-CM

## 2023-09-22 PROCEDURE — 99213 OFFICE O/P EST LOW 20 MIN: CPT | Performed by: PHYSICIAN ASSISTANT

## 2023-09-22 NOTE — PROGRESS NOTES
Assessment/Plan:  - comprehensive STI screening ordered  - Will reach out to pt with results  - Pt provided with birth control guide and IUD pamphlets. Diagnoses and all orders for this visit:    Screening for STD (sexually transmitted disease)  -     Hepatitis B surface antigen; Future  -     Hepatitis C antibody  -     RPR-Syphilis Screening (Total Syphilis IGG/IGM); Future  -     Chlamydia/GC amplified DNA by PCR  -     HIV 1/2 AG/AB w Reflex [facility] for 2 yr old and above  -     HIV 1/2 AG/AB w Reflex [facility] for 2 yr old and above; Future  -     RPR-Syphilis Screening (Total Syphilis IGG/IGM); Future  -     Hepatitis C antibody; Future  -     Hepatitis B surface antigen; Future  -     SURESWAB(R) ADVANCED VAGINITIS PLUS, TMA          Subjective:      Patient ID: Alexander Moralez is a 34 y.o. female. Janiya Bazan is a 27YO  Wf presenting to the office to discuss STI screening. Patient states her last sexual encounter was one month ago. She states that she did not use condoms at that time. Patient heard through friends since then that her partner 1 month ago tested positive for something. She does not know what infection, but would like to be tested. Patient currently has a cold sore on her lip and a sore throat. She denies any vaginal discharge, odor or abnormal bleeding. She denies ulcers/lesions. The following portions of the patient's history were reviewed and updated as appropriate: allergies, current medications, past family history, past medical history, past social history, past surgical history and problem list.    Review of Systems   Constitutional: Negative for chills, fever and unexpected weight change. Respiratory: Negative for shortness of breath. Cardiovascular: Negative for chest pain. Gastrointestinal: Negative for abdominal pain, diarrhea, nausea and vomiting. Genitourinary: Positive for urgency. Negative for difficulty urinating, dysuria, frequency and hematuria.    Skin: Negative for rash. Psychiatric/Behavioral: Negative for dysphoric mood. The patient is not nervous/anxious. Objective:      BP 98/58 (BP Location: Left arm, Patient Position: Sitting, Cuff Size: Standard)   Ht 5' 2" (1.575 m)   Wt 91.1 kg (200 lb 12.8 oz)   LMP 08/18/2023 (Exact Date)   BMI 36.73 kg/m²          Physical Exam  Vitals reviewed. Constitutional:       Appearance: Normal appearance. HENT:      Head: Normocephalic and atraumatic. Pulmonary:      Effort: Pulmonary effort is normal.   Abdominal:      General: There is no distension. Palpations: Abdomen is soft. Tenderness: There is no abdominal tenderness. Genitourinary:     General: Normal vulva. Exam position: Lithotomy position. Pubic Area: No rash. Labia:         Right: No rash or lesion. Left: No rash or lesion. Vagina: Normal. No vaginal discharge, tenderness or lesions. Cervix: No discharge or lesion. Skin:     General: Skin is warm and dry. Findings: No lesion or rash. Neurological:      General: No focal deficit present. Mental Status: She is alert.

## 2023-09-23 LAB
BV BACTERIA RRNA VAG QL NAA+PROBE: POSITIVE
C GLABRATA RNA VAG QL NAA+PROBE: NOT DETECTED
C TRACH RRNA SPEC QL NAA+PROBE: NOT DETECTED
CANDIDA RRNA VAG QL PROBE: NOT DETECTED
N GONORRHOEA RRNA SPEC QL NAA+PROBE: NOT DETECTED
T VAGINALIS RRNA SPEC QL NAA+PROBE: NOT DETECTED

## 2023-09-25 DIAGNOSIS — N76.0 BACTERIAL VAGINOSIS: Primary | ICD-10-CM

## 2023-09-25 DIAGNOSIS — B96.89 BACTERIAL VAGINOSIS: Primary | ICD-10-CM

## 2023-09-25 RX ORDER — METRONIDAZOLE 7.5 MG/G
1 GEL VAGINAL
Qty: 5 G | Refills: 0 | Status: SHIPPED | OUTPATIENT
Start: 2023-09-25 | End: 2023-09-30

## 2023-10-10 ENCOUNTER — OFFICE VISIT (OUTPATIENT)
Dept: URGENT CARE | Facility: MEDICAL CENTER | Age: 28
End: 2023-10-10
Payer: COMMERCIAL

## 2023-10-10 VITALS
SYSTOLIC BLOOD PRESSURE: 112 MMHG | DIASTOLIC BLOOD PRESSURE: 73 MMHG | OXYGEN SATURATION: 99 % | BODY MASS INDEX: 36.8 KG/M2 | WEIGHT: 200 LBS | HEIGHT: 62 IN | TEMPERATURE: 98 F | RESPIRATION RATE: 18 BRPM | HEART RATE: 75 BPM

## 2023-10-10 DIAGNOSIS — J06.9 VIRAL UPPER RESPIRATORY TRACT INFECTION: ICD-10-CM

## 2023-10-10 DIAGNOSIS — J02.9 SORE THROAT: Primary | ICD-10-CM

## 2023-10-10 LAB — S PYO AG THROAT QL: NEGATIVE

## 2023-10-10 PROCEDURE — 99213 OFFICE O/P EST LOW 20 MIN: CPT | Performed by: NURSE PRACTITIONER

## 2023-10-10 PROCEDURE — 87880 STREP A ASSAY W/OPTIC: CPT | Performed by: NURSE PRACTITIONER

## 2023-10-10 NOTE — PATIENT INSTRUCTIONS
--Rest, drink plenty of fluids  --Rapid strep test negative. --Consider vitamin C, zinc, quercetin, and vitamin D to help strengthen your immune system  --For cough, you can take an OTC expectorant such as plain Robitussion or Mucinex (active ingredient guaifenesin). A spoonful of honey at bedtime may also be helpful, as may a prescription cough medicine. Also recommended is the use of a cool mist humidifier (with or without Vicks) in the bedroom at night. --For sore throat, you can take OTC lozenges, use warm gargles (salt water or apple cider vinegar and honey), herbal teas, or an OTC throat spray (Chloraseptic). --For nasal/sinus congestion, helpful measures include steam, warm compresses, an OTC saline nasal spray or Neti pot, or an OTC decongestant (such as Sudafed). The decongestant should be avoided, however, if you are under 10years of age, or have a history of high blood pressure or heart disease. In addition, an OTC nasal steroid (Flonase, Nasocort) or nasal decongestant (Afrin, Ruddy-synephrine) may be taken. The nasal steroid should be used at bedtime, after the saline nasal spray. The nasal decongestant should not be taken more than 3 days consecutively in order to prevent rebound congestion. --For nasal drainage, postnasal drip, sneezing and itching, an OTC antihistamine (Allegra, Benadryl, etc) can be taken. --You can take Tylenol or Motrin/Advil as needed for fever, headache, body aches. Motrin/Advil should be avoided, however, if you have a history of heart disease, bleeding ulcers, or if you take blood thinners. --You should contact your primary care provider and/or go to the ER if your symptoms are not improved or get worse over the next 7 days. This includes new onset fever, localized ear pain, sinus pain, as well as worsening cough, chest pain, shortness of breath, or significant weakness/fatigue.

## 2023-10-10 NOTE — PROGRESS NOTES
[facility]        NAME: Alexander Moralez is a 34 y.o. female  : 1995    MRN: [id number]  DATE: October 10, 2023  TIME: 6:05 PM    Assessment and Plan   Sore throat [J02.9]  1. Sore throat  POCT rapid strepA      2. Viral upper respiratory tract infection              Patient Instructions     --Rest, drink plenty of fluids  --Rapid strep test negative. --Consider vitamin C, zinc, quercetin, and vitamin D to help strengthen your immune system  --For cough, you can take an OTC expectorant such as plain Robitussion or Mucinex (active ingredient guaifenesin). A spoonful of honey at bedtime may also be helpful, as may a prescription cough medicine. Also recommended is the use of a cool mist humidifier (with or without Vicks) in the bedroom at night. --For sore throat, you can take OTC lozenges, use warm gargles (salt water or apple cider vinegar and honey), herbal teas, or an OTC throat spray (Chloraseptic). --For nasal/sinus congestion, helpful measures include steam, warm compresses, an OTC saline nasal spray or Neti pot, or an OTC decongestant (such as Sudafed). The decongestant should be avoided, however, if you are under 10years of age, or have a history of high blood pressure or heart disease. In addition, an OTC nasal steroid (Flonase, Nasocort) or nasal decongestant (Afrin, Urddy-synephrine) may be taken. The nasal steroid should be used at bedtime, after the saline nasal spray. The nasal decongestant should not be taken more than 3 days consecutively in order to prevent rebound congestion. --For nasal drainage, postnasal drip, sneezing and itching, an OTC antihistamine (Allegra, Benadryl, etc) can be taken. --You can take Tylenol or Motrin/Advil as needed for fever, headache, body aches. Motrin/Advil should be avoided, however, if you have a history of heart disease, bleeding ulcers, or if you take blood thinners.    --You should contact your primary care provider and/or go to the ER if your symptoms are not improved or get worse over the next 7 days. This includes new onset fever, localized ear pain, sinus pain, as well as worsening cough, chest pain, shortness of breath, or significant weakness/fatigue. Chief Complaint     Chief Complaint   Patient presents with   • Sore Throat     Pt reports sore throat, productive cough and pnd started last night. History of Present Illness       Here with complaints of sore throat, nasal congestion, PND, cough since yesterday. No fever, headache, body aches, ear pain. Threw up once from PND. No further N/V. No abdominal pain. No dyspnea. No OTC meds taken. No COVID concerns. Gets symptoms every year around this time, but denies overt allergies. Review of Systems   Review of Systems   Constitutional: Negative for fever. HENT: Positive for postnasal drip, rhinorrhea and sore throat. Negative for ear pain. Respiratory: Positive for cough. Musculoskeletal: Negative for myalgias. Neurological: Negative for headaches. Current Medications     No current outpatient medications on file. Current Allergies     Allergies as of 10/10/2023 - Reviewed 10/10/2023   Allergen Reaction Noted   • Penicillins  2018            The following portions of the patient's history were reviewed and updated as appropriate: allergies, current medications, past family history, past medical history, past social history, past surgical history and problem list.     History reviewed. No pertinent past medical history.     Past Surgical History:   Procedure Laterality Date   •  SECTION         Family History   Problem Relation Age of Onset   • Liver cancer Mother    • No Known Problems Father    • No Known Problems Sister    • No Known Problems Sister    • No Known Problems Brother    • Early death Brother         MVA   • No Known Problems Son    • Liver cancer Maternal Grandmother    • No Known Problems Maternal Grandfather • No Known Problems Paternal Grandmother    • No Known Problems Paternal Grandfather          Medications have been verified. Objective   /73   Pulse 75   Temp 98 °F (36.7 °C) (Temporal)   Resp 18   Ht 5' 2" (1.575 m)   Wt 90.7 kg (200 lb)   LMP 09/22/2023 (Exact Date)   SpO2 99%   BMI 36.58 kg/m²   Patient's last menstrual period was 09/22/2023 (exact date). Physical Exam     Physical Exam  Constitutional:       General: She is not in acute distress. Appearance: Normal appearance. She is well-developed. She is not ill-appearing, toxic-appearing or diaphoretic. HENT:      Head: Normocephalic. Right Ear: Tympanic membrane, ear canal and external ear normal.      Left Ear: Tympanic membrane, ear canal and external ear normal.      Nose: Congestion and rhinorrhea present. Comments: No sinus tenderness. Mouth/Throat:      Pharynx: No oropharyngeal exudate or posterior oropharyngeal erythema. Eyes:      General:         Right eye: No discharge. Left eye: No discharge. Cardiovascular:      Rate and Rhythm: Normal rate and regular rhythm. Heart sounds: Normal heart sounds. No murmur heard. Pulmonary:      Effort: Pulmonary effort is normal. No respiratory distress. Breath sounds: Normal breath sounds. No stridor. No wheezing, rhonchi or rales. Chest:      Chest wall: No tenderness. Musculoskeletal:      Cervical back: Neck supple. Lymphadenopathy:      Cervical: No cervical adenopathy. Skin:     General: Skin is warm and dry. Neurological:      Mental Status: She is alert and oriented to person, place, and time. Deep Tendon Reflexes: Reflexes are normal and symmetric.    Psychiatric:         Mood and Affect: Mood normal.

## 2024-02-26 ENCOUNTER — OFFICE VISIT (OUTPATIENT)
Dept: GYNECOLOGY | Facility: CLINIC | Age: 29
End: 2024-02-26
Payer: COMMERCIAL

## 2024-02-26 VITALS
WEIGHT: 201 LBS | HEIGHT: 62 IN | BODY MASS INDEX: 36.99 KG/M2 | SYSTOLIC BLOOD PRESSURE: 110 MMHG | DIASTOLIC BLOOD PRESSURE: 70 MMHG

## 2024-02-26 DIAGNOSIS — N89.8 VAGINAL DISCHARGE: ICD-10-CM

## 2024-02-26 DIAGNOSIS — R30.0 DYSURIA: ICD-10-CM

## 2024-02-26 DIAGNOSIS — R35.0 URINARY FREQUENCY: ICD-10-CM

## 2024-02-26 DIAGNOSIS — Z20.2 EXPOSURE TO SEXUALLY TRANSMITTED DISEASE (STD): Primary | ICD-10-CM

## 2024-02-26 LAB
SL AMB  POCT GLUCOSE, UA: ABNORMAL
SL AMB LEUKOCYTE ESTERASE,UA: ABNORMAL
SL AMB POCT BILIRUBIN,UA: ABNORMAL
SL AMB POCT BLOOD,UA: ABNORMAL
SL AMB POCT CLARITY,UA: CLEAR
SL AMB POCT COLOR,UA: YELLOW
SL AMB POCT KETONES,UA: ABNORMAL
SL AMB POCT NITRITE,UA: ABNORMAL
SL AMB POCT PH,UA: ABNORMAL
SL AMB POCT SPECIFIC GRAVITY,UA: ABNORMAL
SL AMB POCT URINE PROTEIN: ABNORMAL
SL AMB POCT UROBILINOGEN: ABNORMAL

## 2024-02-26 PROCEDURE — 99213 OFFICE O/P EST LOW 20 MIN: CPT | Performed by: PHYSICIAN ASSISTANT

## 2024-02-26 PROCEDURE — 81002 URINALYSIS NONAUTO W/O SCOPE: CPT | Performed by: PHYSICIAN ASSISTANT

## 2024-02-26 RX ORDER — NITROFURANTOIN 25; 75 MG/1; MG/1
100 CAPSULE ORAL 2 TIMES DAILY
Qty: 14 CAPSULE | Refills: 1 | Status: SHIPPED | OUTPATIENT
Start: 2024-02-26 | End: 2024-03-04

## 2024-02-26 NOTE — PROGRESS NOTES
"Assessment/Plan:    No problem-specific Assessment & Plan notes found for this encounter.       Diagnoses and all orders for this visit:    Exposure to sexually transmitted disease (STD)  -     GP Cervicitis W/O PAP W/O HPV PLUS  -     GP Vaginitis/Vaginosis W/O PAP W/O HPV  Expanded    Vaginal discharge  -     GP Cervicitis W/O PAP W/O HPV PLUS  -     GP Vaginitis/Vaginosis W/O PAP W/O HPV  Expanded    Urinary frequency  -     POCT urine dip  -     nitrofurantoin (MACROBID) 100 mg capsule; Take 1 capsule (100 mg total) by mouth 2 (two) times a day for 7 days    Dysuria  -     nitrofurantoin (MACROBID) 100 mg capsule; Take 1 capsule (100 mg total) by mouth 2 (two) times a day for 7 days          Subjective:      Patient ID: Brooke Godinez is a 28 y.o. female.    Pt presents as a new patient problem today  She states that she has had a potential exposure to a \"STD\" and would like full testing today  She complains of some vaginal irritation  +urinary frequency and +urinary burning  No back pain  No fever, chills  Pt denies any lesions  No major vaginal discharge, itching or odor  Her periods are regular    Cultures done  UA+  Rx macrobid 100mg bid x 7 days  Rx for BW in system from 9/2023 appt with Meghana hoang PA-C        The following portions of the patient's history were reviewed and updated as appropriate: allergies, current medications, past family history, past medical history, past social history, past surgical history, and problem list.    Review of Systems   Constitutional:  Negative for chills, fever and unexpected weight change.   HENT:  Negative for ear pain and sore throat.    Eyes:  Negative for pain and visual disturbance.   Respiratory:  Negative for cough and shortness of breath.    Cardiovascular:  Negative for chest pain and palpitations.   Gastrointestinal:  Negative for abdominal pain, blood in stool, constipation, diarrhea and vomiting.   Genitourinary:  Positive for dysuria and frequency. " "Negative for hematuria and vaginal discharge.   Musculoskeletal:  Negative for arthralgias and back pain.   Skin:  Negative for color change and rash.   Neurological:  Negative for seizures and syncope.   All other systems reviewed and are negative.        Objective:      /70   Ht 5' 2\" (1.575 m)   Wt 91.2 kg (201 lb)   LMP 02/11/2024 (Exact Date)   BMI 36.76 kg/m²          Physical Exam  Vitals and nursing note reviewed.   Constitutional:       Appearance: She is well-developed. She is obese.   Genitourinary:     Exam position: Supine.      Labia:         Right: No rash or lesion.         Left: No rash or lesion.       Vagina: Vaginal discharge present.      Cervix: No cervical motion tenderness, discharge or friability.      Comments: +mild erythema  Lymphadenopathy:      Lower Body: No right inguinal adenopathy. No left inguinal adenopathy.   Neurological:      Mental Status: She is alert.           "

## 2024-02-26 NOTE — PATIENT INSTRUCTIONS
Will check culture and call pt with results--please call office if no word from us within 1 week  Change all products (ie soaps/body washes/laundry detergents etc) to unscented, hypoallergenic, for sensitive skin  Increase water/yogurt/probiotics/cranberry tablets daily  Change out of wet bathing suits and sweaty clothes ASAP  Witch hazel/hydrocortisone externally PRN for itching and burning

## 2024-02-28 ENCOUNTER — TELEPHONE (OUTPATIENT)
Age: 29
End: 2024-02-28

## 2024-02-28 DIAGNOSIS — B96.89 BV (BACTERIAL VAGINOSIS): Primary | ICD-10-CM

## 2024-02-28 DIAGNOSIS — N76.0 BV (BACTERIAL VAGINOSIS): Primary | ICD-10-CM

## 2024-02-28 LAB
A VAGINAE DNA VAG NAA+PROBE-LOG#: <3.25
A VAGINAE DNA VAG QL NAA+PROBE: NOT DETECTED
BVAB2 DNA VAG QL NAA+PROBE: NOT DETECTED
C TRACH DNA SPEC QL PROBE+SIG AMP: NOT DETECTED
G VAGINALIS DNA SPEC QL NAA+PROBE: DETECTED
G VAGINALIS DNA VAG NAA+PROBE-LOG#: ABNORMAL
HSV1 DNA SPEC QL NAA+PROBE: NOT DETECTED
HSV2 DNA SPEC QL NAA+PROBE: NOT DETECTED
LACTOBACILLUS DNA VAG NAA+PROBE-LOG#: <3.25
LACTOBACILLUS DNA VAG NAA+PROBE-LOG#: NOT DETECTED
M GENITALIUM DNA SPEC QL NAA+PROBE: NOT DETECTED
MEGA1 DNA VAG QL NAA+PROBE: DETECTED
N GONORRHOEA DNA SPEC QL NAA+PROBE: NOT DETECTED
SL AMB C.ALBICANS BY PCR: NOT DETECTED
SL AMB CANDIDA GENUS: NOT DETECTED
T VAGINALIS RRNA SPEC QL NAA+PROBE: NOT DETECTED
T VAGINALIS RRNA SPEC QL NAA+PROBE: NOT DETECTED

## 2024-02-28 RX ORDER — METRONIDAZOLE 7.5 MG/G
1 GEL VAGINAL
Qty: 70 G | Refills: 0 | Status: SHIPPED | OUTPATIENT
Start: 2024-02-28 | End: 2024-03-04

## 2024-02-28 RX ORDER — METRONIDAZOLE 7.5 MG/G
1 GEL VAGINAL
Qty: 5 G | Refills: 0 | Status: SHIPPED | OUTPATIENT
Start: 2024-02-28 | End: 2024-03-04

## 2024-02-28 NOTE — TELEPHONE ENCOUNTER
----- Message from Neyda Vernon PA-C sent at 2/28/2024  8:20 AM EST -----  +BV---rx metrogel qhs x 5 nights

## 2024-07-05 ENCOUNTER — HOSPITAL ENCOUNTER (EMERGENCY)
Facility: HOSPITAL | Age: 29
Discharge: HOME | End: 2024-07-06
Attending: EMERGENCY MEDICINE | Admitting: EMERGENCY MEDICINE
Payer: COMMERCIAL

## 2024-07-05 ENCOUNTER — APPOINTMENT (EMERGENCY)
Dept: ULTRASOUND IMAGING | Facility: HOSPITAL | Age: 29
End: 2024-07-05
Payer: COMMERCIAL

## 2024-07-05 VITALS
SYSTOLIC BLOOD PRESSURE: 127 MMHG | OXYGEN SATURATION: 99 % | DIASTOLIC BLOOD PRESSURE: 78 MMHG | TEMPERATURE: 98.9 F | RESPIRATION RATE: 18 BRPM | HEART RATE: 85 BPM

## 2024-07-05 DIAGNOSIS — O00.90 ECTOPIC PREGNANCY: Primary | ICD-10-CM

## 2024-07-05 DIAGNOSIS — R10.2 PELVIC PAIN: ICD-10-CM

## 2024-07-05 DIAGNOSIS — N93.9 VAGINAL BLEEDING: ICD-10-CM

## 2024-07-05 LAB
ALBUMIN SERPL BCG-MCNC: 4.7 G/DL (ref 3.5–5)
ALP SERPL-CCNC: 61 U/L (ref 34–104)
ALT SERPL W P-5'-P-CCNC: 14 U/L (ref 7–52)
ANION GAP SERPL CALCULATED.3IONS-SCNC: 9 MMOL/L (ref 4–13)
APTT PPP: 32 SECONDS (ref 23–37)
AST SERPL W P-5'-P-CCNC: 17 U/L (ref 13–39)
B-HCG SERPL-ACNC: 85.5 MIU/ML (ref 0–5)
BASOPHILS # BLD AUTO: 0.05 THOUSANDS/ÂΜL (ref 0–0.1)
BASOPHILS NFR BLD AUTO: 1 % (ref 0–1)
BILIRUB SERPL-MCNC: 0.37 MG/DL (ref 0.2–1)
BUN SERPL-MCNC: 15 MG/DL (ref 5–25)
CALCIUM SERPL-MCNC: 9.6 MG/DL (ref 8.4–10.2)
CHLORIDE SERPL-SCNC: 107 MMOL/L (ref 96–108)
CO2 SERPL-SCNC: 23 MMOL/L (ref 21–32)
CREAT SERPL-MCNC: 0.7 MG/DL (ref 0.6–1.3)
EOSINOPHIL # BLD AUTO: 0.11 THOUSAND/ÂΜL (ref 0–0.61)
EOSINOPHIL NFR BLD AUTO: 1 % (ref 0–6)
ERYTHROCYTE [DISTWIDTH] IN BLOOD BY AUTOMATED COUNT: 13.9 % (ref 11.6–15.1)
EXT PREGNANCY TEST URINE: POSITIVE
EXT. CONTROL: ABNORMAL
GFR SERPL CREATININE-BSD FRML MDRD: 119 ML/MIN/1.73SQ M
GLUCOSE SERPL-MCNC: 88 MG/DL (ref 65–140)
HCT VFR BLD AUTO: 42.6 % (ref 34.8–46.1)
HGB BLD-MCNC: 13.7 G/DL (ref 11.5–15.4)
IMM GRANULOCYTES # BLD AUTO: 0.02 THOUSAND/UL (ref 0–0.2)
IMM GRANULOCYTES NFR BLD AUTO: 0 % (ref 0–2)
INR PPP: 1.04 (ref 0.84–1.19)
LIPASE SERPL-CCNC: 34 U/L (ref 11–82)
LYMPHOCYTES # BLD AUTO: 1.66 THOUSANDS/ÂΜL (ref 0.6–4.47)
LYMPHOCYTES NFR BLD AUTO: 18 % (ref 14–44)
MCH RBC QN AUTO: 27.9 PG (ref 26.8–34.3)
MCHC RBC AUTO-ENTMCNC: 32.2 G/DL (ref 31.4–37.4)
MCV RBC AUTO: 87 FL (ref 82–98)
MONOCYTES # BLD AUTO: 0.85 THOUSAND/ÂΜL (ref 0.17–1.22)
MONOCYTES NFR BLD AUTO: 9 % (ref 4–12)
NEUTROPHILS # BLD AUTO: 6.44 THOUSANDS/ÂΜL (ref 1.85–7.62)
NEUTS SEG NFR BLD AUTO: 71 % (ref 43–75)
NRBC BLD AUTO-RTO: 0 /100 WBCS
PLATELET # BLD AUTO: 372 THOUSANDS/UL (ref 149–390)
PMV BLD AUTO: 10.5 FL (ref 8.9–12.7)
POTASSIUM SERPL-SCNC: 3.7 MMOL/L (ref 3.5–5.3)
PROT SERPL-MCNC: 7.5 G/DL (ref 6.4–8.4)
PROTHROMBIN TIME: 14.2 SECONDS (ref 11.6–14.5)
RBC # BLD AUTO: 4.91 MILLION/UL (ref 3.81–5.12)
SODIUM SERPL-SCNC: 139 MMOL/L (ref 135–147)
WBC # BLD AUTO: 9.13 THOUSAND/UL (ref 4.31–10.16)

## 2024-07-05 PROCEDURE — 86900 BLOOD TYPING SEROLOGIC ABO: CPT | Performed by: EMERGENCY MEDICINE

## 2024-07-05 PROCEDURE — 86850 RBC ANTIBODY SCREEN: CPT | Performed by: EMERGENCY MEDICINE

## 2024-07-05 PROCEDURE — 84702 CHORIONIC GONADOTROPIN TEST: CPT | Performed by: EMERGENCY MEDICINE

## 2024-07-05 PROCEDURE — 81025 URINE PREGNANCY TEST: CPT | Performed by: EMERGENCY MEDICINE

## 2024-07-05 PROCEDURE — 36415 COLL VENOUS BLD VENIPUNCTURE: CPT

## 2024-07-05 PROCEDURE — 85025 COMPLETE CBC W/AUTO DIFF WBC: CPT | Performed by: EMERGENCY MEDICINE

## 2024-07-05 PROCEDURE — 85610 PROTHROMBIN TIME: CPT

## 2024-07-05 PROCEDURE — 83690 ASSAY OF LIPASE: CPT | Performed by: EMERGENCY MEDICINE

## 2024-07-05 PROCEDURE — 76815 OB US LIMITED FETUS(S): CPT

## 2024-07-05 PROCEDURE — 99284 EMERGENCY DEPT VISIT MOD MDM: CPT

## 2024-07-05 PROCEDURE — 86901 BLOOD TYPING SEROLOGIC RH(D): CPT | Performed by: EMERGENCY MEDICINE

## 2024-07-05 PROCEDURE — 85730 THROMBOPLASTIN TIME PARTIAL: CPT

## 2024-07-05 PROCEDURE — 80053 COMPREHEN METABOLIC PANEL: CPT | Performed by: EMERGENCY MEDICINE

## 2024-07-06 PROBLEM — O00.202 ECTOPIC PREGNANCY OF LEFT OVARY: Status: ACTIVE | Noted: 2024-07-06

## 2024-07-06 LAB
ABO GROUP BLD: NORMAL
ABO GROUP BLD: NORMAL
BLD GP AB SCN SERPL QL: NEGATIVE
RH BLD: POSITIVE
RH BLD: POSITIVE
SPECIMEN EXPIRATION DATE: NORMAL

## 2024-07-06 PROCEDURE — 99285 EMERGENCY DEPT VISIT HI MDM: CPT | Performed by: EMERGENCY MEDICINE

## 2024-07-06 PROCEDURE — 96375 TX/PRO/DX INJ NEW DRUG ADDON: CPT

## 2024-07-06 PROCEDURE — 36415 COLL VENOUS BLD VENIPUNCTURE: CPT | Performed by: EMERGENCY MEDICINE

## 2024-07-06 PROCEDURE — 96372 THER/PROPH/DIAG INJ SC/IM: CPT

## 2024-07-06 PROCEDURE — 96374 THER/PROPH/DIAG INJ IV PUSH: CPT

## 2024-07-06 RX ORDER — MEDROXYPROGESTERONE ACETATE 150 MG/ML
150 INJECTION, SUSPENSION INTRAMUSCULAR ONCE
Status: COMPLETED | OUTPATIENT
Start: 2024-07-06 | End: 2024-07-06

## 2024-07-06 RX ORDER — ONDANSETRON 2 MG/ML
4 INJECTION INTRAMUSCULAR; INTRAVENOUS ONCE
Status: COMPLETED | OUTPATIENT
Start: 2024-07-06 | End: 2024-07-06

## 2024-07-06 RX ORDER — KETOROLAC TROMETHAMINE 30 MG/ML
15 INJECTION, SOLUTION INTRAMUSCULAR; INTRAVENOUS ONCE
Status: COMPLETED | OUTPATIENT
Start: 2024-07-06 | End: 2024-07-06

## 2024-07-06 RX ORDER — METHOTREXATE 25 MG/ML
50 INJECTION, SOLUTION INTRA-ARTERIAL; INTRAMUSCULAR; INTRAVENOUS ONCE
Status: COMPLETED | OUTPATIENT
Start: 2024-07-06 | End: 2024-07-06

## 2024-07-06 RX ADMIN — METHOTREXATE 95 MG: 25 SOLUTION INTRA-ARTERIAL; INTRAMUSCULAR; INTRATHECAL; INTRAVENOUS at 01:04

## 2024-07-06 RX ADMIN — MEDROXYPROGESTERONE ACETATE 150 MG: 150 INJECTION, SUSPENSION INTRAMUSCULAR at 01:01

## 2024-07-06 RX ADMIN — KETOROLAC TROMETHAMINE 15 MG: 30 INJECTION, SOLUTION INTRAMUSCULAR; INTRAVENOUS at 00:35

## 2024-07-06 RX ADMIN — ONDANSETRON 4 MG: 2 INJECTION INTRAMUSCULAR; INTRAVENOUS at 00:36

## 2024-07-06 NOTE — CONSULTS
CONSULTATION - OB/GYN  Brooke Godinez 29 y.o. female MRN: [id number]  Unit/Bed#: ED-37 Encounter: [id number]        ASSESSMENT/PLAN:  28yo  with new diagnosis of ectopic pregnancy vs. Early pregnancy vs. SAB. Pelvic imaging without IUP identified and noted left adnexal mass concerning for ectopic pregnancy. Undesired pregnancy. VSS WNL. Non-surgical abdomen. Stable.  - IM Methotrexate 50mg/m^2 ordered for management  - Repeat bHCG levels on day 4 () and day 7 (Fr )  - IM Depo-Provera 150mg ordered for contraception management  - Plan for outpatient follow-up in 1 week    OBSTETRIC ULTRASOUND, LIMITED   INDICATION: Pregnancy. Vaginal bleeding. Abdominal pain.  COMPARISON: None.  TECHNIQUE:   Transabdominal ultrasound of the pelvis was performed.  Additional transvaginal imaging was then performed to better assess, myometrial/endometrial architecture and ovarian parenchymal detail.  The study includes volumetric sweeps and   traditional still imaging technique.  FINDINGS:  UTERUS:  The uterus is anteverted in position, measuring 9.0 x 4.1 x 5.4 cm.  Contour and echotexture appear normal.  The cervix is closed and within normal limits.  ENDOMETRIUM:  Endometrial stripe measures 7.0 mm.  No evidence of intrauterine gestation.  OVARIES/ADNEXA:  There is free fluid.  Right ovary:  3.2 x 2.3 x 1.9 cm. Volume 7.2  Doppler flow present.  No suspicious abnormality.  Left ovary:  3.3 x 2.5 x 1.9 cm.  Volume  8.3  Doppler flow present. Adjacent to the left ovary there is a 1.6 x 1.2 x 1.7 cm hyperemic mass in the left adnexa with Doppler flow concerning for ectopic pregnancy     IMPRESSION:  1.  No intrauterine gestation seen.  2.  1.6 x 1.2 x 1.7 cm hyperemic left adnexal mass concerning for ectopic pregnancy. OB/GYN consultation is recommended.    SUBJECTIVE:    HPI: Brooke Godinez is a 29 y.o. female who presents with vaginal bleeding and left lower quadrant abdominal pain. She reports that she believes  she had a miscarriage in February although never had a pregnancy test but did notice heavy vaginal bleeding. She had return of regular monthly menses until the end of May. LMP 24 and positive UPT, expected GA 6w2d. bHCG 85.5. Pelvic imaging as above. Brooke reports that she was not planning pregnancy and that this is undesired. We discussed her low bHCG level and reviewed her pelvic imaging. Although impression concerning for left adnexal ectopic pregnancy, imaging could represent a degenerating corpus luteal cyst, and given a low bHCG level an ectopic pregnancy may not be truly represented. Given patient's vaginal bleeding and initial acute LLQ abdominal pain, we discussed differential remaining left adnexal pregnancy vs. Early intrauterine pregnancy vs. Spontaneous . We reviewed expectant management vs. Methotrexate therapy. Vs. Surgical management. Brooke prefers to proceed with medical management with Methotrexate. Counseled regarding contraindications, which she does not meet, and most common side effects including nausea and stomatitis Counseled regarding strict return precautions and follow-up bHCG levels to assess for adequacy of treatment. We also discussed contraception management moving forward. Brooke was considering resuming OCPs vs starting a new form of management, and after further discussion, patient opted for management with Depo-Provera. All questions answered.      Review of Systems   Constitutional:  Negative for chills and fever.   HENT:  Negative for congestion, sinus pressure and sinus pain.    Eyes:  Negative for visual disturbance.   Respiratory:  Negative for cough, shortness of breath and wheezing.    Cardiovascular:  Negative for chest pain, palpitations and leg swelling.   Gastrointestinal:  Positive for abdominal pain. Negative for constipation, diarrhea, nausea and vomiting.   Genitourinary:  Positive for vaginal bleeding. Negative for dysuria and vaginal discharge.    Skin:  Negative for color change, pallor and rash.   Neurological:  Negative for weakness and light-headedness.   Psychiatric/Behavioral:  Negative for agitation and behavioral problems.        Historical Information   History reviewed. No pertinent past medical history.  Past Surgical History:   Procedure Laterality Date     SECTION       OB History    Para Term  AB Living   1 1 1   0 1   SAB IAB Ectopic Multiple Live Births           1      # Outcome Date GA Lbr Joni/2nd Weight Sex Type Anes PTL Lv   1 Term     M CS-LTranv   LESIA      Obstetric Comments   Menarche 18     Family History   Problem Relation Age of Onset    Liver cancer Mother     No Known Problems Father     No Known Problems Sister     No Known Problems Sister     No Known Problems Brother     Early death Brother         MVA    No Known Problems Son     Liver cancer Maternal Grandmother     No Known Problems Maternal Grandfather     No Known Problems Paternal Grandmother     No Known Problems Paternal Grandfather      Social History   Social History     Substance and Sexual Activity   Alcohol Use Yes    Comment: rare socially     Social History     Substance and Sexual Activity   Drug Use Not Currently    Types: Marijuana     Social History     Tobacco Use   Smoking Status Former    Current packs/day: 0.00    Types: Cigarettes    Quit date: 2022    Years since quittin.5   Smokeless Tobacco Never       Meds/Allergies   No current facility-administered medications for this encounter.       Allergies   Allergen Reactions    Penicillins          OBJECTIVE:    Vitals: Blood pressure 127/78, pulse 85, temperature 98.9 °F (37.2 °C), temperature source Oral, resp. rate 18, SpO2 99%, not currently breastfeeding. There is no height or weight on file to calculate BMI.    Physical Exam  Vitals and nursing note reviewed. Exam conducted with a chaperone present.   Constitutional:       General: She is not in acute distress.  HENT:       Head: Normocephalic.      Right Ear: External ear normal.      Left Ear: External ear normal.   Eyes:      General: No scleral icterus.        Right eye: No discharge.         Left eye: No discharge.      Conjunctiva/sclera: Conjunctivae normal.   Cardiovascular:      Rate and Rhythm: Normal rate and regular rhythm.      Pulses: Normal pulses.      Heart sounds: Normal heart sounds.   Pulmonary:      Effort: Pulmonary effort is normal. No respiratory distress.      Breath sounds: Normal breath sounds.   Abdominal:      General: There is no distension.      Palpations: Abdomen is soft.      Tenderness: There is no abdominal tenderness. There is no guarding.   Genitourinary:     General: Normal vulva.      Comments: Speculum exam with dark red blood coming from external cervical os  No pooling or blood clots in vaginal vault  Musculoskeletal:         General: No swelling or tenderness. Normal range of motion.      Cervical back: Normal range of motion.      Right lower leg: No edema.      Left lower leg: No edema.   Skin:     General: Skin is warm and dry.      Capillary Refill: Capillary refill takes less than 2 seconds.   Neurological:      Mental Status: She is alert and oriented to person, place, and time. Mental status is at baseline.   Psychiatric:         Mood and Affect: Mood normal.         Behavior: Behavior normal.           Lab Results:   Recent Results (from the past 24 hour(s))   CBC and differential    Collection Time: 07/05/24  9:25 PM   Result Value Ref Range    WBC 9.13 4.31 - 10.16 Thousand/uL    RBC 4.91 3.81 - 5.12 Million/uL    Hemoglobin 13.7 11.5 - 15.4 g/dL    Hematocrit 42.6 34.8 - 46.1 %    MCV 87 82 - 98 fL    MCH 27.9 26.8 - 34.3 pg    MCHC 32.2 31.4 - 37.4 g/dL    RDW 13.9 11.6 - 15.1 %    MPV 10.5 8.9 - 12.7 fL    Platelets 372 149 - 390 Thousands/uL    nRBC 0 /100 WBCs    Segmented % 71 43 - 75 %    Immature Grans % 0 0 - 2 %    Lymphocytes % 18 14 - 44 %    Monocytes % 9 4 - 12 %     Eosinophils Relative 1 0 - 6 %    Basophils Relative 1 0 - 1 %    Absolute Neutrophils 6.44 1.85 - 7.62 Thousands/µL    Absolute Immature Grans 0.02 0.00 - 0.20 Thousand/uL    Absolute Lymphocytes 1.66 0.60 - 4.47 Thousands/µL    Absolute Monocytes 0.85 0.17 - 1.22 Thousand/µL    Eosinophils Absolute 0.11 0.00 - 0.61 Thousand/µL    Basophils Absolute 0.05 0.00 - 0.10 Thousands/µL   Comprehensive metabolic panel    Collection Time: 07/05/24  9:25 PM   Result Value Ref Range    Sodium 139 135 - 147 mmol/L    Potassium 3.7 3.5 - 5.3 mmol/L    Chloride 107 96 - 108 mmol/L    CO2 23 21 - 32 mmol/L    ANION GAP 9 4 - 13 mmol/L    BUN 15 5 - 25 mg/dL    Creatinine 0.70 0.60 - 1.30 mg/dL    Glucose 88 65 - 140 mg/dL    Calcium 9.6 8.4 - 10.2 mg/dL    AST 17 13 - 39 U/L    ALT 14 7 - 52 U/L    Alkaline Phosphatase 61 34 - 104 U/L    Total Protein 7.5 6.4 - 8.4 g/dL    Albumin 4.7 3.5 - 5.0 g/dL    Total Bilirubin 0.37 0.20 - 1.00 mg/dL    eGFR 119 ml/min/1.73sq m   Lipase    Collection Time: 07/05/24  9:25 PM   Result Value Ref Range    Lipase 34 11 - 82 u/L   hCG, quantitative    Collection Time: 07/05/24  9:25 PM   Result Value Ref Range    HCG, Quant 85.5 (H) 0 - 5 mIU/mL   POCT pregnancy, urine    Collection Time: 07/05/24  9:44 PM   Result Value Ref Range    EXT Preg Test, Ur Positive (A)     Control Valid    Protime-INR    Collection Time: 07/05/24 11:14 PM   Result Value Ref Range    Protime 14.2 11.6 - 14.5 seconds    INR 1.04 0.84 - 1.19   APTT    Collection Time: 07/05/24 11:14 PM   Result Value Ref Range    PTT 32 23 - 37 seconds       Imaging Studies: I have personally reviewed pertinent reports.      EKG, Pathology, and Other Studies: I have personally reviewed pertinent reports.        Iron Jesus MD  OB/GYN PGY-4  7/5/2024  11:47 PM

## 2024-07-06 NOTE — ED ATTENDING ATTESTATION
7/5/2024  I, Saeid Gonzalez MD, saw and evaluated the patient. I have discussed the patient with the resident/non-physician practitioner and agree with the resident's/non-physician practitioner's findings, Plan of Care, and MDM as documented in the resident's/non-physician practitioner's note, except where noted. All available labs and Radiology studies were reviewed.  I was present for key portions of any procedure(s) performed by the resident/non-physician practitioner and I was immediately available to provide assistance.       At this point I agree with the current assessment done in the Emergency Department.  I have conducted an independent evaluation of this patient a history and physical is as follows:    History    Patient is a 29-year-old female, with a history significant for tobacco use per my review of medical record as well as self-reported prior miscarriage, who presents to the ED today for evaluation of gradual onset, constant, atraumatic, progressively worsening, severe intensity, crampy in character left lower quadrant abdominal pain.  There is associated vomiting believed to be secondary to pain as well as vaginal bleeding.  Patient states she recently found out she is pregnant and her last menstrual period was about 5 weeks ago.  Patient took Tylenol x 1 pill to try and remit her symptoms.  Touch exacerbates her symptoms.  There is no associated fever, chest pain, dyspnea, urinary symptoms, weakness, numbness.     Patient is without other concerns at this time.     ROS  Patient denies: Fever; dysphagia; vision change; chest pain; dyspnea; polyuria; dysuria; rash; weakness; numbness; difficulty walking; confusion    Physical Exam    GENERAL APPEARANCE: Uncomfortable appearing  NEURO: Patient is speaking clearly in complete sentences.  Patient is answering appropriately and able follow commands.  Patient is moving all four extremities spontaneously.  No facial droop.  Tongue  midline.  HEENT: PERRL, Moist mucous membranes, external ears normal, nose normal  Neck: No cervical adenopathy  CV: RRR. No murmurs, rubs, gallops  LUNGS: Clear to auscultation: No wheezes, stridor, rhonchi, rales  GI: Abdomen non-distended. Soft.  Tender to palpation in the left adnexal region with no guarding or rebound.  No CVA tenderness bilaterally  : Deferred at this time  MSK: No deformity.   Skin: Warm and dry  Capillary refill: <2 seconds    Patient is currently afebrile and hemodynamically stable.    Assessment/Plan/MDM  Abdominal pain, vaginal bleeding, vomiting, pregnancy  -Concern for ectopic pregnancy versus threatened .  I also considered anemia.  No reason to suspect her urolithiasis at this time based on history physical exam.  -Will investigate with type and screen, coagulation studies, pregnancy test, CBC, CMP  -Will manage with analgesia and antiemetics and further based upon workup/response    ED Course  ED Course as of 24 0014   Sat 2024   0008 Rh Factor: Positive  Does not need rhogam    0008 Per my discussion with on-call OB/GYN, Dr. Jesus, they will order methotrexate and patient will be able to go home.  They are okay with Toradol for analgesia         Critical Care Time  Procedures

## 2024-07-06 NOTE — DISCHARGE INSTR - AVS FIRST PAGE
Today is day 1 (7/6) after receiving methotrexate. Please repeat hormone levels (bHCG) on:  - Day 4, Tuesday (7/9)  - Day 7, Friday (7/12)    The office will call you regarding results and discuss continued follow-up.    Please make an appointment in one week for follow-up.

## 2024-07-06 NOTE — DISCHARGE INSTRUCTIONS
Follow up with Ob/Gyn as soon as you are able. You should go to the lab and repeat your HCG level on Tuesday and Friday this week, orders have been placed. If you experience worsening symptoms, return to the ER. Continue to monitor for bleeding and also return to the ER if you soak through one pad per hour for more than 2 hours.

## 2024-07-07 NOTE — ED PROVIDER NOTES
History  Chief Complaint   Patient presents with    Vaginal Bleeding     Pt reports being 1 week late for her period. Today woke up with severe lower abdominal cramping and heavy vaginal bleeding/clots.      HPI    Patient is a 28 yo F presenting with left lower quadrant abdominal pain. Symptoms began today, earlier in the day. She describes them as really severe period cramps, although with her typical period she does not get cramps. She also reports being one week late for her period, an episode of vomiting earlier today. She did take tylenol earlier with only minimum relief.   She is not currently on any contraceptive medication, denies any urinary symptoms, change in bowel habits.   She is concerned that she is pregnant and having a miscarriage as she has reportedly had one before, approximately 4 years ago.     None       History reviewed. No pertinent past medical history.    Past Surgical History:   Procedure Laterality Date     SECTION         Family History   Problem Relation Age of Onset    Liver cancer Mother     No Known Problems Father     No Known Problems Sister     No Known Problems Sister     No Known Problems Brother     Early death Brother         MVA    No Known Problems Son     Liver cancer Maternal Grandmother     No Known Problems Maternal Grandfather     No Known Problems Paternal Grandmother     No Known Problems Paternal Grandfather      I have reviewed and agree with the history as documented.    E-Cigarette/Vaping    E-Cigarette Use Never User      E-Cigarette/Vaping Substances    Nicotine No     THC No     CBD No     Flavoring No     Other No     Unknown No      Social History     Tobacco Use    Smoking status: Former     Current packs/day: 0.00     Types: Cigarettes     Quit date: 2022     Years since quittin.5    Smokeless tobacco: Never   Vaping Use    Vaping status: Never Used   Substance Use Topics    Alcohol use: Yes     Comment: rare socially    Drug use: Not  Currently     Types: Marijuana        Review of Systems   Constitutional:  Negative for chills and fever.   HENT:  Negative for ear pain and sore throat.    Eyes:  Negative for pain and visual disturbance.   Respiratory:  Negative for cough and shortness of breath.    Cardiovascular:  Negative for chest pain and palpitations.   Gastrointestinal:  Positive for abdominal pain, nausea and vomiting.   Genitourinary:  Positive for pelvic pain and vaginal bleeding. Negative for dysuria and hematuria.   Musculoskeletal:  Negative for arthralgias and back pain.   Skin:  Negative for color change and rash.   Neurological:  Negative for seizures and syncope.   All other systems reviewed and are negative.      Physical Exam  ED Triage Vitals [07/05/24 2110]   Temperature Pulse Respirations Blood Pressure SpO2   98.9 °F (37.2 °C) 85 18 127/78 99 %      Temp Source Heart Rate Source Patient Position - Orthostatic VS BP Location FiO2 (%)   Oral Monitor Sitting Right arm --      Pain Score       --             Orthostatic Vital Signs  Vitals:    07/05/24 2110   BP: 127/78   Pulse: 85   Patient Position - Orthostatic VS: Sitting       Physical Exam  Vitals and nursing note reviewed.   Constitutional:       General: She is not in acute distress.     Appearance: She is well-developed.   HENT:      Head: Normocephalic and atraumatic.   Eyes:      Conjunctiva/sclera: Conjunctivae normal.   Cardiovascular:      Rate and Rhythm: Normal rate and regular rhythm.      Heart sounds: No murmur heard.  Pulmonary:      Effort: Pulmonary effort is normal. No respiratory distress.      Breath sounds: Normal breath sounds.   Abdominal:      Palpations: Abdomen is soft.      Tenderness: There is abdominal tenderness. There is no guarding or rebound.      Comments: LLQ/left pelvic region tenderness to palpation   Musculoskeletal:         General: No swelling.   Skin:     General: Skin is warm and dry.   Neurological:      Mental Status: She is  alert.   Psychiatric:         Mood and Affect: Mood normal.         ED Medications  Medications   ketorolac (TORADOL) injection 15 mg (15 mg Intravenous Given 7/6/24 0035)   ondansetron (ZOFRAN) injection 4 mg (4 mg Intravenous Given 7/6/24 0036)   methotrexate injection 95 mg (95 mg Intramuscular Given 7/6/24 0104)   medroxyPROGESTERone (DEPO-PROVERA) IM injection 150 mg (150 mg Intramuscular Given 7/6/24 0101)       Diagnostic Studies  Results Reviewed       Procedure Component Value Units Date/Time    Protime-INR [[id number]]  (Normal) Collected: 07/05/24 2314    Lab Status: Final result Specimen: Blood from Arm, Right Updated: 07/05/24 2340     Protime 14.2 seconds      INR 1.04    APTT [[id number]]  (Normal) Collected: 07/05/24 2314    Lab Status: Final result Specimen: Blood from Arm, Right Updated: 07/05/24 2340     PTT 32 seconds     hCG, quantitative [[id number]]  (Abnormal) Collected: 07/05/24 2125    Lab Status: Final result Specimen: Blood from Arm, Right Updated: 07/05/24 2237     HCG, Quant 85.5 mIU/mL     Narrative:       Expected Ranges:    HCG results between 5.0 and 25.0 mIU/mL may be indicative of early pregnancy but should be interpreted in light of the total clinical presentation.    HCG can rise to detectable levels in jacob and post menopausal women (0-11.6 mIU/mL).     Approximate               Approximate HCG  Gestation age          Concentration ( mIU/mL)  _____________          ______________________   Weeks                      HCG values  0.2-1                       5-50  1-2                           2-3                         100-5000  3-4                         500-14056  4-5                         1000-47494  5-6                         [id number]  6-8                         [id number]  8-12                        [id number]      Comprehensive metabolic panel [[id number]] Collected: 07/05/24 2125    Lab Status: Final result Specimen: Blood from Arm, Right Updated: 07/05/24  2202     Sodium 139 mmol/L      Potassium 3.7 mmol/L      Chloride 107 mmol/L      CO2 23 mmol/L      ANION GAP 9 mmol/L      BUN 15 mg/dL      Creatinine 0.70 mg/dL      Glucose 88 mg/dL      Calcium 9.6 mg/dL      AST 17 U/L      ALT 14 U/L      Alkaline Phosphatase 61 U/L      Total Protein 7.5 g/dL      Albumin 4.7 g/dL      Total Bilirubin 0.37 mg/dL      eGFR 119 ml/min/1.73sq m     Narrative:      National Kidney Disease Foundation guidelines for Chronic Kidney Disease (CKD):     Stage 1 with normal or high GFR (GFR > 90 mL/min/1.73 square meters)    Stage 2 Mild CKD (GFR = 60-89 mL/min/1.73 square meters)    Stage 3A Moderate CKD (GFR = 45-59 mL/min/1.73 square meters)    Stage 3B Moderate CKD (GFR = 30-44 mL/min/1.73 square meters)    Stage 4 Severe CKD (GFR = 15-29 mL/min/1.73 square meters)    Stage 5 End Stage CKD (GFR <15 mL/min/1.73 square meters)  Note: GFR calculation is accurate only with a steady state creatinine    Lipase [[id number]]  (Normal) Collected: 07/05/24 2125    Lab Status: Final result Specimen: Blood from Arm, Right Updated: 07/05/24 2202     Lipase 34 u/L     CBC and differential [[id number]] Collected: 07/05/24 2125    Lab Status: Final result Specimen: Blood from Arm, Right Updated: 07/05/24 2153     WBC 9.13 Thousand/uL      RBC 4.91 Million/uL      Hemoglobin 13.7 g/dL      Hematocrit 42.6 %      MCV 87 fL      MCH 27.9 pg      MCHC 32.2 g/dL      RDW 13.9 %      MPV 10.5 fL      Platelets 372 Thousands/uL      nRBC 0 /100 WBCs      Segmented % 71 %      Immature Grans % 0 %      Lymphocytes % 18 %      Monocytes % 9 %      Eosinophils Relative 1 %      Basophils Relative 1 %      Absolute Neutrophils 6.44 Thousands/µL      Absolute Immature Grans 0.02 Thousand/uL      Absolute Lymphocytes 1.66 Thousands/µL      Absolute Monocytes 0.85 Thousand/µL      Eosinophils Absolute 0.11 Thousand/µL      Basophils Absolute 0.05 Thousands/µL     POCT pregnancy, urine [[id number]]  (Abnormal)  Resulted: 07/05/24 2144    Lab Status: Final result Specimen: Urine Updated: 07/05/24 2146     EXT Preg Test, Ur Positive     Control Valid                   US OB pregnancy limited with transvaginal   Final Result by Dylan Slaughter MD (07/05 2323)      1.  No intrauterine gestation seen.   2.  1.6 x 1.2 x 1.7 cm hyperemic left adnexal mass concerning for ectopic pregnancy. OB/GYN consultation is recommended.      I personally discussed this study with Dr. Patton on 7/5/2024 11:22 PM.                        Workstation performed: [id number]               Procedures  Procedures      ED Course  ED Course as of 07/07/24 1011   Fri Jul 05, 2024 2255 PREGNANCY TEST URINE(!): Positive   2255 HCG QUANTITATIVE(!): 85.5   2319 US OB pregnancy limited with transvaginal   2320 US OB pregnancy limited with transvaginal  Radiology confirmed concern for ectopic pregnancy in left adnexa - messaged OB team for further evaluation and management recs                                       Medical Decision Making  30 yo F presenting with abdominal pain, vomiting, vaginal bleeding. On initial evaluation, pt was calm, in no acute distress, and with stable vital signs. POC urine testing done on arrival showed positive pregnancy test. Physical exam was with some LLQ/L pelvic are tenderness. Given positive pregnancy test, concern is for ectopic pregnancy, implantation bleeding, subchorionic hemorrhage vs miscarriage. With this in mind, labs were drawn which showed stable hemoglobin, positive Rh factor. US of the pelvis showed findings concerning for ectopic pregnancy after discussion with radiology. Ob/gyn was contacted and evaluated the patient, was uncertain about diagnosis of ectopic pregnancy vs breakdown of corpus luteum in setting of miscarriage. Discussed management options moving forward and ultimately patient decided to move forward with medical management. Dose of methotrexate was given in the emergency department along with  depo-provera for contraception and she was ultimately discharged in stable condition with counseling to follow up in 4, 7 days respectively for HCG lab follow up.     Amount and/or Complexity of Data Reviewed  Labs: ordered. Decision-making details documented in ED Course.  Radiology: ordered. Decision-making details documented in ED Course.    Risk  Prescription drug management.          Disposition  Final diagnoses:   Concern for Ectopic Pregnancy   Vaginal bleeding   Pelvic pain     Time reflects when diagnosis was documented in both MDM as applicable and the Disposition within this note       Time User Action Codes Description Comment    7/5/2024 11:24 PM Ilana Patton Add [O00.90] Ectopic pregnancy     7/6/2024  1:11 AM Ilana Patton Modify [O00.90] Concern for Ectopic Pregnancy     7/6/2024  1:24 AM Ilana Patton Add [N93.9] Vaginal bleeding     7/6/2024  1:24 AM Ilana Patton Add [R10.2] Pelvic pain           ED Disposition       ED Disposition   Discharge    Condition   Stable    Date/Time   Sat Jul 6, 2024  1:14 AM    Comment   Brooke Godinez discharge to home/self care.                   Follow-up Information       Follow up With Specialties Details Why Contact Info Additional Information    [facility] OBGYN Obstetrics and Gynecology Schedule an appointment as soon as possible for a visit   [facility] [facility] [address] [phone] [facility] OBGYN, [address], [place], [address] [phone]    [facility] Emergency Department Emergency Medicine Go to  If symptoms worsen [address] [facility] [id number] [phone] [facility] Emergency Department, [address], [place], [address]            There are no discharge medications for this patient.    Outpatient Discharge Orders   hCG, quantitative   Standing Status: Future Standing Exp. Date:  07/06/25     hCG, quantitative   Standing Status: Future Standing Exp. Date: 07/06/25       PDMP Review       None             ED Provider  Attending physically available and evaluated Brooke Godinez. I managed the patient along with the ED Attending.    Electronically Signed by           Ilana Patton DO  07/07/24 1021

## 2024-07-09 ENCOUNTER — APPOINTMENT (OUTPATIENT)
Dept: LAB | Facility: MEDICAL CENTER | Age: 29
End: 2024-07-09
Payer: COMMERCIAL

## 2024-07-09 DIAGNOSIS — O00.90 ECTOPIC PREGNANCY: ICD-10-CM

## 2024-07-09 DIAGNOSIS — Z11.3 SCREENING FOR STD (SEXUALLY TRANSMITTED DISEASE): ICD-10-CM

## 2024-07-09 LAB — B-HCG SERPL-ACNC: 14.9 MIU/ML (ref 0–5)

## 2024-07-09 PROCEDURE — 86780 TREPONEMA PALLIDUM: CPT

## 2024-07-09 PROCEDURE — 87340 HEPATITIS B SURFACE AG IA: CPT

## 2024-07-09 PROCEDURE — 84702 CHORIONIC GONADOTROPIN TEST: CPT

## 2024-07-10 LAB
C TRACH DNA SPEC QL NAA+PROBE: NEGATIVE
HBV SURFACE AG SER QL: NORMAL
HCV AB SER QL: NORMAL
HIV 1+2 AB+HIV1 P24 AG SERPL QL IA: NORMAL
HIV 2 AB SERPL QL IA: NORMAL
HIV1 AB SERPL QL IA: NORMAL
HIV1 P24 AG SERPL QL IA: NORMAL
N GONORRHOEA DNA SPEC QL NAA+PROBE: NEGATIVE
TREPONEMA PALLIDUM IGG+IGM AB [PRESENCE] IN SERUM OR PLASMA BY IMMUNOASSAY: NORMAL

## 2024-07-12 ENCOUNTER — APPOINTMENT (OUTPATIENT)
Dept: LAB | Facility: MEDICAL CENTER | Age: 29
End: 2024-07-12
Payer: COMMERCIAL

## 2024-07-12 ENCOUNTER — OFFICE VISIT (OUTPATIENT)
Dept: OBGYN CLINIC | Facility: CLINIC | Age: 29
End: 2024-07-12
Payer: COMMERCIAL

## 2024-07-12 VITALS
HEIGHT: 62 IN | SYSTOLIC BLOOD PRESSURE: 118 MMHG | WEIGHT: 198 LBS | BODY MASS INDEX: 36.44 KG/M2 | DIASTOLIC BLOOD PRESSURE: 74 MMHG

## 2024-07-12 DIAGNOSIS — R93.89 ABNORMAL PELVIC ULTRASOUND: Primary | ICD-10-CM

## 2024-07-12 DIAGNOSIS — Z92.21 H/O METHOTREXATE THERAPY: ICD-10-CM

## 2024-07-12 DIAGNOSIS — R10.2 PELVIC PAIN: ICD-10-CM

## 2024-07-12 DIAGNOSIS — O00.90 ECTOPIC PREGNANCY, UNSPECIFIED LOCATION, UNSPECIFIED WHETHER INTRAUTERINE PREGNANCY PRESENT: ICD-10-CM

## 2024-07-12 LAB — B-HCG SERPL-ACNC: 4.1 MIU/ML (ref 0–5)

## 2024-07-12 PROCEDURE — 36415 COLL VENOUS BLD VENIPUNCTURE: CPT

## 2024-07-12 PROCEDURE — 99213 OFFICE O/P EST LOW 20 MIN: CPT | Performed by: PHYSICIAN ASSISTANT

## 2024-07-12 PROCEDURE — 84702 CHORIONIC GONADOTROPIN TEST: CPT

## 2024-07-12 NOTE — PROGRESS NOTES
Assessment/Plan:   - Reviewed notes and findings from ER visit  - Significant decrease in bHCG from day 1 to day 4  - Repeat bHCG today  - Pelvic US ordered to complete in 3-4 weeks to confirm resolution of L adnexal lesion  - Patient to go to ER if severe pain  - Recommend waiting at least 3 months prior to attempting conception again.   - Call for concerns  - RTO PRN     Diagnoses and all orders for this visit:    Abnormal pelvic ultrasound  -     US pelvis complete w transvaginal; Future    H/O methotrexate therapy    Pelvic pain  -     Ambulatory Referral to Obstetrics / Gynecology          Subjective:     Patient ID: Brooke Godinez is a 29 y.o. female.    Brooke is a 30YO  WF presenting to the office for follow up after methotrexate administration for suspected ectopic pregnancy. Patient presented to the ER on  with complaints of vaginal bleeding and LLQ abdominal/pelvic pain. She reported her LMP as 24, and was expected to be 6w2d at that time. She had a bHCG of 85.5 and US suspicious for left adnexal ectopic as no IUP was visualized. Patient was treated with IM methotrexate. She completed day 4 bHCG which decreased from 85.5 to 14.9. She is due for day 7 bHCG today which she had drawn this morning. She reports her bleeding slowed after methotrexate administration. She had some brown spotting yesterday, but no bleeding today.  She has not had any more pain. This was an unexpected pregnancy.         Review of Systems   Gastrointestinal:  Negative for abdominal pain, nausea and vomiting.   Genitourinary:  Negative for pelvic pain, vaginal bleeding, vaginal discharge and vaginal pain.         Objective:     Physical Exam  Vitals reviewed.   Constitutional:       General: She is not in acute distress.     Appearance: Normal appearance. She is not ill-appearing, toxic-appearing or diaphoretic.   HENT:      Head: Normocephalic and atraumatic.   Pulmonary:      Effort: Pulmonary effort is normal.    Skin:     General: Skin is warm and dry.   Neurological:      General: No focal deficit present.      Mental Status: She is alert.   Psychiatric:         Mood and Affect: Mood normal.         Behavior: Behavior normal.             US completed 7/5/2024    US OB pregnancy limited with transvaginal  Narrative: OBSTETRIC ULTRASOUND, LIMITED    INDICATION: Pregnancy. Vaginal bleeding. Abdominal pain.    COMPARISON: None.    TECHNIQUE:   Transabdominal ultrasound of the pelvis was performed.  Additional transvaginal imaging was then performed to better assess, myometrial/endometrial architecture and ovarian parenchymal detail.  The study includes volumetric sweeps and   traditional still imaging technique.    FINDINGS:    UTERUS:  The uterus is anteverted in position, measuring 9.0 x 4.1 x 5.4 cm.  Contour and echotexture appear normal.  The cervix is closed and within normal limits.    ENDOMETRIUM:  Endometrial stripe measures 7.0 mm.  No evidence of intrauterine gestation.    OVARIES/ADNEXA:    There is free fluid.    Right ovary:  3.2 x 2.3 x 1.9 cm. Volume 7.2  Doppler flow present.  No suspicious abnormality.    Left ovary:  3.3 x 2.5 x 1.9 cm.  Volume  8.3  Doppler flow present. Adjacent to the left ovary there is a 1.6 x 1.2 x 1.7 cm hyperemic mass in the left adnexa with Doppler flow concerning for ectopic pregnancy  Impression: 1.  No intrauterine gestation seen.  2.  1.6 x 1.2 x 1.7 cm hyperemic left adnexal mass concerning for ectopic pregnancy. OB/GYN consultation is recommended.    I personally discussed this study with Dr. Patton on 7/5/2024 11:22 PM.    Workstation performed: [id number]    I have spent a total time of 20 minutes in caring for this patient on the day of the visit/encounter including Instructions for management, Patient and family education, Documenting in the medical record, Obtaining or reviewing history  , and Communicating with other healthcare professionals .

## 2024-08-09 ENCOUNTER — APPOINTMENT (OUTPATIENT)
Dept: LAB | Facility: MEDICAL CENTER | Age: 29
End: 2024-08-09
Payer: COMMERCIAL

## 2024-08-09 DIAGNOSIS — Z11.1 ENCOUNTER FOR SCREENING FOR RESPIRATORY TUBERCULOSIS: ICD-10-CM

## 2024-08-09 PROCEDURE — 86480 TB TEST CELL IMMUN MEASURE: CPT

## 2024-08-09 PROCEDURE — 36415 COLL VENOUS BLD VENIPUNCTURE: CPT

## 2024-08-10 LAB
GAMMA INTERFERON BACKGROUND BLD IA-ACNC: 0.01 IU/ML
M TB IFN-G BLD-IMP: NEGATIVE
M TB IFN-G CD4+ BCKGRND COR BLD-ACNC: 0 IU/ML
M TB IFN-G CD4+ BCKGRND COR BLD-ACNC: 0 IU/ML
MITOGEN IGNF BCKGRD COR BLD-ACNC: 9.99 IU/ML

## 2024-09-24 ENCOUNTER — APPOINTMENT (OUTPATIENT)
Dept: LAB | Facility: AMBULARY SURGERY CENTER | Age: 29
End: 2024-09-24
Payer: COMMERCIAL

## 2024-09-24 ENCOUNTER — OFFICE VISIT (OUTPATIENT)
Dept: OBGYN CLINIC | Facility: CLINIC | Age: 29
End: 2024-09-24
Payer: COMMERCIAL

## 2024-09-24 VITALS
SYSTOLIC BLOOD PRESSURE: 112 MMHG | BODY MASS INDEX: 33.49 KG/M2 | DIASTOLIC BLOOD PRESSURE: 72 MMHG | HEIGHT: 62 IN | WEIGHT: 182 LBS

## 2024-09-24 DIAGNOSIS — N76.0 ACUTE VAGINITIS: Primary | ICD-10-CM

## 2024-09-24 DIAGNOSIS — R35.0 INCREASED URINARY FREQUENCY: ICD-10-CM

## 2024-09-24 DIAGNOSIS — Z11.3 SCREEN FOR STD (SEXUALLY TRANSMITTED DISEASE): ICD-10-CM

## 2024-09-24 DIAGNOSIS — Z30.42 ENCOUNTER FOR MANAGEMENT AND INJECTION OF DEPO-PROVERA: ICD-10-CM

## 2024-09-24 DIAGNOSIS — Z30.42 ENCOUNTER FOR SURVEILLANCE OF INJECTABLE CONTRACEPTIVE: ICD-10-CM

## 2024-09-24 LAB
HBV SURFACE AG SER QL: NORMAL
HCV AB SER QL: NORMAL
SL AMB  POCT GLUCOSE, UA: NEGATIVE
SL AMB LEUKOCYTE ESTERASE,UA: 15
SL AMB POCT BILIRUBIN,UA: NEGATIVE
SL AMB POCT BLOOD,UA: NEGATIVE
SL AMB POCT CLARITY,UA: ABNORMAL
SL AMB POCT COLOR,UA: YELLOW
SL AMB POCT KETONES,UA: 5
SL AMB POCT NITRITE,UA: NEGATIVE
SL AMB POCT PH,UA: 6
SL AMB POCT SPECIFIC GRAVITY,UA: 1.02
SL AMB POCT URINE PROTEIN: NEGATIVE
SL AMB POCT UROBILINOGEN: 0.2
TREPONEMA PALLIDUM IGG+IGM AB [PRESENCE] IN SERUM OR PLASMA BY IMMUNOASSAY: NORMAL

## 2024-09-24 PROCEDURE — 86803 HEPATITIS C AB TEST: CPT

## 2024-09-24 PROCEDURE — 87591 N.GONORRHOEAE DNA AMP PROB: CPT

## 2024-09-24 PROCEDURE — 86780 TREPONEMA PALLIDUM: CPT

## 2024-09-24 PROCEDURE — 87660 TRICHOMONAS VAGIN DIR PROBE: CPT

## 2024-09-24 PROCEDURE — 96372 THER/PROPH/DIAG INJ SC/IM: CPT

## 2024-09-24 PROCEDURE — 87491 CHLMYD TRACH DNA AMP PROBE: CPT

## 2024-09-24 PROCEDURE — 99213 OFFICE O/P EST LOW 20 MIN: CPT

## 2024-09-24 PROCEDURE — 81003 URINALYSIS AUTO W/O SCOPE: CPT

## 2024-09-24 PROCEDURE — 87086 URINE CULTURE/COLONY COUNT: CPT

## 2024-09-24 PROCEDURE — 87077 CULTURE AEROBIC IDENTIFY: CPT

## 2024-09-24 PROCEDURE — 87510 GARDNER VAG DNA DIR PROBE: CPT

## 2024-09-24 PROCEDURE — 36415 COLL VENOUS BLD VENIPUNCTURE: CPT

## 2024-09-24 PROCEDURE — 87340 HEPATITIS B SURFACE AG IA: CPT

## 2024-09-24 PROCEDURE — 87389 HIV-1 AG W/HIV-1&-2 AB AG IA: CPT

## 2024-09-24 PROCEDURE — 87186 SC STD MICRODIL/AGAR DIL: CPT

## 2024-09-24 PROCEDURE — 87480 CANDIDA DNA DIR PROBE: CPT

## 2024-09-24 RX ORDER — MEDROXYPROGESTERONE ACETATE 150 MG/ML
150 INJECTION, SUSPENSION INTRAMUSCULAR
Qty: 1 ML | Refills: 4 | Status: SHIPPED | OUTPATIENT
Start: 2024-09-24

## 2024-09-24 RX ORDER — NITROFURANTOIN 25; 75 MG/1; MG/1
CAPSULE ORAL
COMMUNITY
Start: 2024-08-14 | End: 2024-09-26

## 2024-09-24 RX ORDER — MEDROXYPROGESTERONE ACETATE 150 MG/ML
150 INJECTION, SUSPENSION INTRAMUSCULAR ONCE
Status: COMPLETED | OUTPATIENT
Start: 2024-09-24 | End: 2024-09-24

## 2024-09-24 RX ADMIN — MEDROXYPROGESTERONE ACETATE 150 MG: 150 INJECTION, SUSPENSION INTRAMUSCULAR at 15:46

## 2024-09-24 NOTE — PROGRESS NOTES
Patient presents to office for depo injection given in left deltoid per patients request, patient tolerated well.    Lot number-[id number]  Expiration-11/2026  NDC- 19109-527-30    UPT not indicated- within range of depo-provera administration   Last depo 07/05/2024  Last yearly/ last office visit: 07/12/2024 with Meghana CRUZ     Next depo due dec 10-dec 24

## 2024-09-24 NOTE — PROGRESS NOTES
Assessment/Plan:   - We discussed daily probiotic supplementation  - Will await testing results to treat due to nonspecific symptoms and absence of clinical findings for infections.      Diagnoses and all orders for this visit:    Acute vaginitis  -     Cancel: VAGINOSIS DNA PROBE  -     VAGINOSIS DNA PROBE    Increased urinary frequency  -     Urine culture  -     POCT urine dip auto non-scope    Screen for STD (sexually transmitted disease)  -     Hepatitis C antibody; Future  -     Hepatitis B surface antigen; Future  -     HIV 1/2 AG/AB w Reflex [facility] for 2 yr old and above; Future  -     RPR-Syphilis Screening (Total Syphilis IGG/IGM); Future  -     Chlamydia/GC amplified DNA by PCR  -     Cancel: VAGINOSIS DNA PROBE  -     VAGINOSIS DNA PROBE    Encounter for surveillance of injectable contraceptive  -     medroxyPROGESTERone (DEPO-PROVERA) 150 mg/mL injection; Inject 1 mL (150 mg total) into a muscle every 3 (three) months    Encounter for management and injection of depo-Provera  -     medroxyPROGESTERone (DEPO-PROVERA) IM injection 150 mg    Other orders  -     nitrofurantoin (MACROBID) 100 mg capsule; take 1 capsule by mouth twice a day for 7 days (Patient not taking: Reported on 2024)          Subjective:     Patient ID: Brooke Godinez is a 29 y.o. female.    Patient is a 28 y/o  F presenting to the office with multiple concerns today. She is concerned for UTI vs vaginal infection due to recent symptoms of urinary frequency, pelvic pressure, and abnormal vaginal discharge. She notes that she was seen at the beginning of the year for similar symptoms and she was dx with BV. She denies odor, itching, pelvic/vaginal pain, fever/chills. She denies use of new soaps/detergents, she is using an unscented bar soap in the shower. She would like STD screening tests.   She was seen in the ED 2024 for LLQ abdominal pain and had findings concerning for ectopic pregnancy. She was given MTX in the ER  and had f/u in our office on 7/12/2024. She was started on Depo-Provera and given her first injection in the ED 7/5/24. Refills sent for her today. She will return for injection.         Objective:     Physical Exam  Vitals and nursing note reviewed.   Constitutional:       Appearance: Normal appearance.   HENT:      Head: Normocephalic and atraumatic.   Abdominal:      Palpations: Abdomen is soft.      Tenderness: There is no abdominal tenderness. There is no right CVA tenderness or left CVA tenderness.   Genitourinary:     General: Normal vulva.      Exam position: Lithotomy position.      Labia:         Right: No tenderness or lesion.         Left: No tenderness or lesion.       Vagina: No vaginal discharge.      Cervix: Normal. No lesion or erythema.   Neurological:      General: No focal deficit present.      Mental Status: She is alert and oriented to person, place, and time.   Psychiatric:         Mood and Affect: Mood normal.         Behavior: Behavior normal.           I have spent a total time of 25 minutes in caring for this patient on the day of the visit/encounter including Diagnostic results, Instructions for management, Patient and family education, Documenting in the medical record, Reviewing / ordering tests, medicine, procedures  , and Obtaining or reviewing history  .

## 2024-09-25 LAB
C TRACH DNA SPEC QL NAA+PROBE: NEGATIVE
CANDIDA RRNA VAG QL PROBE: NOT DETECTED
G VAGINALIS RRNA GENITAL QL PROBE: NOT DETECTED
HIV 1+2 AB+HIV1 P24 AG SERPL QL IA: NORMAL
HIV 2 AB SERPL QL IA: NORMAL
HIV1 AB SERPL QL IA: NORMAL
HIV1 P24 AG SERPL QL IA: NORMAL
N GONORRHOEA DNA SPEC QL NAA+PROBE: NEGATIVE
T VAGINALIS RRNA GENITAL QL PROBE: NOT DETECTED

## 2024-09-26 DIAGNOSIS — N30.00 ACUTE CYSTITIS WITHOUT HEMATURIA: Primary | ICD-10-CM

## 2024-09-26 LAB — BACTERIA UR CULT: ABNORMAL

## 2024-09-26 RX ORDER — NITROFURANTOIN 25; 75 MG/1; MG/1
100 CAPSULE ORAL 2 TIMES DAILY
Qty: 14 CAPSULE | Refills: 0 | Status: SHIPPED | OUTPATIENT
Start: 2024-09-26 | End: 2024-10-03

## 2024-10-03 DIAGNOSIS — N30.00 ACUTE CYSTITIS WITHOUT HEMATURIA: Primary | ICD-10-CM

## 2024-10-03 RX ORDER — CIPROFLOXACIN 250 MG/1
250 TABLET, FILM COATED ORAL EVERY 12 HOURS SCHEDULED
Qty: 10 TABLET | Refills: 0 | Status: SHIPPED | OUTPATIENT
Start: 2024-10-03 | End: 2024-10-08

## 2024-12-09 ENCOUNTER — TELEPHONE (OUTPATIENT)
Dept: OBGYN CLINIC | Facility: CLINIC | Age: 29
End: 2024-12-09

## 2024-12-09 NOTE — TELEPHONE ENCOUNTER
Patient called regarding Depo-Provera shot, she has appt 12/11/24 for the shot but she was unsure if she was to pick that up at pharm. I confirmed that there was an order with refills available to her at [facility] and that she should get that from them to take with her. Patient understands and will do so.

## 2024-12-10 NOTE — TELEPHONE ENCOUNTER
Patient called to refill depo. I informed her that she still have 4 refills at [facility]. She will call back and try to speak to a live person.

## 2024-12-11 ENCOUNTER — CLINICAL SUPPORT (OUTPATIENT)
Dept: OBGYN CLINIC | Facility: CLINIC | Age: 29
End: 2024-12-11
Payer: COMMERCIAL

## 2024-12-11 VITALS
WEIGHT: 187.4 LBS | HEIGHT: 62 IN | SYSTOLIC BLOOD PRESSURE: 110 MMHG | BODY MASS INDEX: 34.48 KG/M2 | DIASTOLIC BLOOD PRESSURE: 66 MMHG

## 2024-12-11 DIAGNOSIS — Z30.42 ENCOUNTER FOR MANAGEMENT AND INJECTION OF DEPO-PROVERA: Primary | ICD-10-CM

## 2024-12-11 DIAGNOSIS — Z30.42 ENCOUNTER FOR SURVEILLANCE OF INJECTABLE CONTRACEPTIVE: ICD-10-CM

## 2024-12-11 PROCEDURE — 96372 THER/PROPH/DIAG INJ SC/IM: CPT

## 2024-12-11 RX ORDER — MEDROXYPROGESTERONE ACETATE 150 MG/ML
150 INJECTION, SUSPENSION INTRAMUSCULAR ONCE
Status: COMPLETED | OUTPATIENT
Start: 2024-12-11 | End: 2024-12-11

## 2024-12-11 RX ORDER — IBUPROFEN 800 MG/1
400 TABLET, FILM COATED ORAL EVERY 6 HOURS PRN
COMMUNITY
Start: 2024-09-25

## 2024-12-11 RX ORDER — AZITHROMYCIN 250 MG/1
250 TABLET, FILM COATED ORAL EVERY 24 HOURS
COMMUNITY
Start: 2024-10-02

## 2024-12-11 RX ADMIN — MEDROXYPROGESTERONE ACETATE 150 MG: 150 INJECTION, SUSPENSION INTRAMUSCULAR at 15:10

## 2024-12-11 NOTE — PROGRESS NOTES
Brooke Godinez is here for her depo provera injection; pt states that she likes it, no weight gain, feels great.  Pt received injection in left deltoid @ 1510; pt tolerated well, no concerns.    Date last pap: no pap on file.  Last Depo-Provera: 09/24/2024.  Side Effects if any: none.  Serum HCG indicated? Not indicated.  Depo-Provera 150 mg IM given by: Tyesha.  Next appointment due 02/27-03/13/2025.     NDC#:  63327-326-34  LOT#:  [id number]  EXP:    12/31/2026

## 2025-01-13 ENCOUNTER — OFFICE VISIT (OUTPATIENT)
Dept: URGENT CARE | Facility: MEDICAL CENTER | Age: 30
End: 2025-01-13
Payer: COMMERCIAL

## 2025-01-13 VITALS
HEIGHT: 62 IN | TEMPERATURE: 98.9 F | WEIGHT: 187 LBS | OXYGEN SATURATION: 99 % | BODY MASS INDEX: 34.41 KG/M2 | HEART RATE: 84 BPM | RESPIRATION RATE: 18 BRPM

## 2025-01-13 DIAGNOSIS — H92.01 OTALGIA OF RIGHT EAR: ICD-10-CM

## 2025-01-13 DIAGNOSIS — R05.1 ACUTE COUGH: ICD-10-CM

## 2025-01-13 DIAGNOSIS — J01.00 ACUTE MAXILLARY SINUSITIS, RECURRENCE NOT SPECIFIED: Primary | ICD-10-CM

## 2025-01-13 DIAGNOSIS — Z87.09 HISTORY OF ASTHMA: ICD-10-CM

## 2025-01-13 PROCEDURE — 99213 OFFICE O/P EST LOW 20 MIN: CPT

## 2025-01-13 RX ORDER — BENZONATATE 200 MG/1
200 CAPSULE ORAL 3 TIMES DAILY PRN
Qty: 20 CAPSULE | Refills: 0 | Status: SHIPPED | OUTPATIENT
Start: 2025-01-13

## 2025-01-13 RX ORDER — PREDNISONE 10 MG/1
TABLET ORAL
Qty: 30 TABLET | Refills: 0 | Status: SHIPPED | OUTPATIENT
Start: 2025-01-13 | End: 2025-01-25

## 2025-01-13 RX ORDER — ALBUTEROL SULFATE 90 UG/1
2 INHALANT RESPIRATORY (INHALATION) EVERY 6 HOURS PRN
Qty: 8.5 G | Refills: 0 | Status: SHIPPED | OUTPATIENT
Start: 2025-01-13

## 2025-01-13 RX ORDER — DOXYCYCLINE 100 MG/1
100 CAPSULE ORAL EVERY 12 HOURS SCHEDULED
Qty: 14 CAPSULE | Refills: 0 | Status: SHIPPED | OUTPATIENT
Start: 2025-01-13 | End: 2025-01-20

## 2025-01-13 NOTE — PATIENT INSTRUCTIONS
Reassurance provided that Brooke Gretchen lungs are clear on examination today.    Majority of cases are viral and will not require antibiotic treatment. Given length of symptoms will treat.     Take antibiotics as prescribed. Complete entire course of antibiotics even if feeling better.     Eat yogurt with live and active cultures and/or take a probiotic at least 3 hours before or after antibiotic dose. Monitor stool for diarrhea and/or blood. If this occurs, contact primary care doctor ASAP.     Take over the counter Mucinex during the day (blue box)  Take Tessalon (cough suppressant) as prescribed    Take steroids as prescribed.   Recommend to take them in the morning and with food  Do not take Ibuprofen while on steroids.   May take Acetaminophen for pain.  Discussed that steroids may elevated blood glucose levels and that pt should monitor blood sugars closely.     Albuterol inhaler as prescribed    Recommend the following options: room humidifier, vicks vapo rub, hot/steamy shower (practice proper safety precautions when handing hot liquids/steam)  Offer fluids frequently to help with hydration, as staying hydrated helps loosen up thick mucous.     May drink warm water with honey. May use lemon.    Warm compresses over sinuses  Over the counter saline nasal spray    Tylenol/Ibuprofen for pain/fever.    Encourage coughing into the elbow instead of the hand.   Washing hands frequently with warm water and soap may help stop spread of infection.    If symptoms do not improve, please follow with PCP in 3-5 days for further evaluation.    Proceed to ER if you become acutely short of breath, you have any difficulty breathing, you develop chest pain or any worsening symptoms.

## 2025-01-13 NOTE — PROGRESS NOTES
[facility]        NAME: Brooke Godinez is a 29 y.o. female  : 1995    MRN: [id number]  DATE: 2025  TIME: 11:24 PM    Assessment and Plan   Acute maxillary sinusitis, recurrence not specified [J01.00]  1. Acute maxillary sinusitis, recurrence not specified  doxycycline hyclate (VIBRAMYCIN) 100 mg capsule      2. Acute cough  doxycycline hyclate (VIBRAMYCIN) 100 mg capsule    albuterol (ProAir HFA) 90 mcg/act inhaler    benzonatate (TESSALON) 200 MG capsule    predniSONE 10 mg tablet      3. Otalgia of right ear        4. History of asthma  albuterol (ProAir HFA) 90 mcg/act inhaler    predniSONE 10 mg tablet            Patient Instructions   Reassurance provided that Brooke Godinez lungs are clear on examination today.    Majority of cases are viral and will not require antibiotic treatment. Given length of symptoms will treat.     Take antibiotics as prescribed. Complete entire course of antibiotics even if feeling better.     Eat yogurt with live and active cultures and/or take a probiotic at least 3 hours before or after antibiotic dose. Monitor stool for diarrhea and/or blood. If this occurs, contact primary care doctor ASAP.     Take over the counter Mucinex during the day (blue box)  Take Tessalon (cough suppressant) as prescribed    Take steroids as prescribed.   Recommend to take them in the morning and with food  Do not take Ibuprofen while on steroids.   May take Acetaminophen for pain.  Discussed that steroids may elevated blood glucose levels and that pt should monitor blood sugars closely.     Albuterol inhaler as prescribed    Recommend the following options: room humidifier, vicks vapo rub, hot/steamy shower (practice proper safety precautions when handing hot liquids/steam)  Offer fluids frequently to help with hydration, as staying hydrated helps loosen up thick mucous.     May drink warm water with honey. May use lemon.    Warm compresses over sinuses  Over the  counter saline nasal spray    Tylenol/Ibuprofen for pain/fever.    Encourage coughing into the elbow instead of the hand.   Washing hands frequently with warm water and soap may help stop spread of infection.    If symptoms do not improve, please follow with PCP in 3-5 days for further evaluation.    Proceed to ER if you become acutely short of breath, you have any difficulty breathing, you develop chest pain or any worsening symptoms.    Chief Complaint     Chief Complaint   Patient presents with    Cold Like Symptoms     Patient states she has had worsening symptoms of cough, congestion, post nasal drip, sinusitis; was told multiple times that it was viral but symptoms have not improved at all; requesting antibiotics     Earache     Right ear pain      History of Present Illness       Earache   There is pain in the right ear. This is a new problem. The current episode started in the past 7 days. There has been no fever. Associated symptoms include coughing, headaches, rhinorrhea and a sore throat. Pertinent negatives include no abdominal pain, diarrhea, ear discharge, rash or vomiting.   Sinusitis  The current episode started 1 to 4 weeks ago. The problem is unchanged. There has been no fever. Associated symptoms include congestion, coughing, ear pain, headaches, sinus pressure and a sore throat. Pertinent negatives include no shortness of breath.       Review of Systems   Review of Systems   HENT:  Positive for congestion, ear pain, postnasal drip, rhinorrhea, sinus pressure, sinus pain and sore throat. Negative for ear discharge.    Respiratory:  Positive for cough. Negative for chest tightness, shortness of breath and wheezing.    Cardiovascular:  Negative for chest pain and palpitations.   Gastrointestinal:  Negative for abdominal pain, diarrhea, nausea and vomiting.   Musculoskeletal:  Negative for myalgias.   Skin:  Negative for color change, rash and wound.   Neurological:  Positive for headaches.      Current Medications       Current Outpatient Medications:     albuterol (ProAir HFA) 90 mcg/act inhaler, Inhale 2 puffs every 6 (six) hours as needed for wheezing or shortness of breath, Disp: 8.5 g, Rfl: 0    benzonatate (TESSALON) 200 MG capsule, Take 1 capsule (200 mg total) by mouth 3 (three) times a day as needed for cough, Disp: 20 capsule, Rfl: 0    doxycycline hyclate (VIBRAMYCIN) 100 mg capsule, Take 1 capsule (100 mg total) by mouth every 12 (twelve) hours for 7 days, Disp: 14 capsule, Rfl: 0    medroxyPROGESTERone (DEPO-PROVERA) 150 mg/mL injection, Inject 1 mL (150 mg total) into a muscle every 3 (three) months, Disp: 1 mL, Rfl: 4    predniSONE 10 mg tablet, Take 4 tablets (40 mg total) by mouth daily for 3 days, THEN 3 tablets (30 mg total) daily for 3 days, THEN 2 tablets (20 mg total) daily for 3 days, THEN 1 tablet (10 mg total) daily for 3 days., Disp: 30 tablet, Rfl: 0    Current Allergies     Allergies as of 2025 - Reviewed 2025   Allergen Reaction Noted    Penicillins  2018            The following portions of the patient's history were reviewed and updated as appropriate: allergies, current medications, past family history, past medical history, past social history, past surgical history and problem list.     Past Medical History:   Diagnosis Date    Ectopic pregnancy 2024       Past Surgical History:   Procedure Laterality Date     SECTION         Family History   Problem Relation Age of Onset    Liver cancer Mother     Cancer Mother         Liver cancer    No Known Problems Father     No Known Problems Sister     No Known Problems Sister     No Known Problems Brother     Early death Brother         MVA    No Known Problems Son     Liver cancer Maternal Grandmother     No Known Problems Maternal Grandfather     No Known Problems Paternal Grandmother     No Known Problems Paternal Grandfather     Breast cancer Neg Hx     Colon cancer Neg Hx     Ovarian cancer  "Neg Hx          Medications have been verified.        Objective   Pulse 84   Temp 98.9 °F (37.2 °C) (Temporal)   Resp 18   Ht 5' 2\" (1.575 m)   Wt 84.8 kg (187 lb)   SpO2 99%   BMI 34.20 kg/m²        Physical Exam     Physical Exam  Vitals and nursing note reviewed.   Constitutional:       General: She is not in acute distress.     Appearance: Normal appearance. She is not ill-appearing, toxic-appearing or diaphoretic.   HENT:      Head: Normocephalic.      Right Ear: Tympanic membrane, ear canal and external ear normal. There is no impacted cerumen.      Left Ear: Tympanic membrane, ear canal and external ear normal. There is no impacted cerumen.      Nose: Mucosal edema, congestion and rhinorrhea present.      Right Turbinates: Enlarged and swollen.      Left Turbinates: Enlarged and swollen.      Right Sinus: Maxillary sinus tenderness present. No frontal sinus tenderness.      Left Sinus: Maxillary sinus tenderness present. No frontal sinus tenderness.      Mouth/Throat:      Mouth: Mucous membranes are moist.      Pharynx: Postnasal drip present. No posterior oropharyngeal erythema.   Cardiovascular:      Rate and Rhythm: Normal rate and regular rhythm.      Pulses: Normal pulses.      Heart sounds: Normal heart sounds. No murmur heard.  Pulmonary:      Effort: Pulmonary effort is normal. No respiratory distress.      Breath sounds: Normal breath sounds. No stridor. No wheezing, rhonchi or rales.   Chest:      Chest wall: No tenderness.   Musculoskeletal:         General: Normal range of motion.   Lymphadenopathy:      Cervical: No cervical adenopathy.   Skin:     General: Skin is warm.   Neurological:      Mental Status: She is alert.                   "

## 2025-02-27 ENCOUNTER — CLINICAL SUPPORT (OUTPATIENT)
Dept: OBGYN CLINIC | Facility: CLINIC | Age: 30
End: 2025-02-27
Payer: COMMERCIAL

## 2025-02-27 VITALS
HEIGHT: 62 IN | SYSTOLIC BLOOD PRESSURE: 120 MMHG | BODY MASS INDEX: 35.7 KG/M2 | DIASTOLIC BLOOD PRESSURE: 64 MMHG | WEIGHT: 194 LBS

## 2025-02-27 DIAGNOSIS — Z30.42 ENCOUNTER FOR MANAGEMENT AND INJECTION OF DEPO-PROVERA: Primary | ICD-10-CM

## 2025-02-27 PROCEDURE — 96372 THER/PROPH/DIAG INJ SC/IM: CPT

## 2025-02-27 RX ORDER — MEDROXYPROGESTERONE ACETATE 150 MG/ML
150 INJECTION, SUSPENSION INTRAMUSCULAR ONCE
Status: COMPLETED | OUTPATIENT
Start: 2025-02-27 | End: 2025-02-27

## 2025-02-27 RX ADMIN — MEDROXYPROGESTERONE ACETATE 150 MG: 150 INJECTION, SUSPENSION INTRAMUSCULAR at 15:30

## 2025-04-10 ENCOUNTER — RESULTS FOLLOW-UP (OUTPATIENT)
Dept: OBGYN CLINIC | Facility: CLINIC | Age: 30
End: 2025-04-10

## 2025-04-11 ENCOUNTER — TELEPHONE (OUTPATIENT)
Age: 30
End: 2025-04-11

## 2025-04-11 NOTE — TELEPHONE ENCOUNTER
Attempted to contact patient.  Voicemail not set up, unable to leave message. Sent [employer] requesting patient call in to discuss.

## 2025-04-11 NOTE — TELEPHONE ENCOUNTER
Pt. Sent a myc message that she might have a UTI. She is having irritation and darker urine color. She said she gets UTI's often.

## 2025-04-12 ENCOUNTER — NURSE TRIAGE (OUTPATIENT)
Dept: OTHER | Facility: OTHER | Age: 30
End: 2025-04-12

## 2025-04-12 NOTE — TELEPHONE ENCOUNTER
"FOLLOW UP: none needed/pt will be seen by  or call    REASON FOR CONVERSATION: Vaginal Discharge    SYMPTOMS: cloudy vaginal discharge, itchiness, urgency, urine more yellow    OTHER: n/a    DISPOSITION: Home Care        Reason for Disposition   Symptoms of a vaginal yeast infection (i.e., white, thick, cottage-cheese-like, itchy, not bad smelling discharge)    Answer Assessment - Initial Assessment Questions  1. DISCHARGE: \"Describe the discharge.\" (e.g., white, yellow, green, gray, foamy, cottage cheese-like)      White/ cloudy    2. ODOR: \"Is there a bad odor?\"      Denies    3. ONSET: \"When did the discharge begin?\"      Today    4. RASH: \"Is there a rash in the genital area?\" If Yes, ask: \"Describe it.\" (e.g., redness, blisters, sores, bumps)      Denies    5. ABDOMEN PAIN: \"Are you having any abdomen pain?\" If Yes, ask: \"What does it feel like? \" (e.g., crampy, dull, intermittent, constant)       Denies    6. ABDOMEN PAIN SEVERITY: If present, ask: \"How bad is it?\" (e.g., Scale 1-10; mild, moderate, or severe)      N/a    7. CAUSE: \"What do you think is causing the discharge?\" \"Have you had the same problem before?\" \"What happened then?\"      Yeast infection    8. OTHER SYMPTOMS: \"Do you have any other symptoms?\" (e.g., fever, itching, vaginal bleeding, pain with urination, injury to genital area, vaginal foreign body)      Itching, feels like bladder is heavy.       Denies burning or blood in urine    9. PREGNANCY: \"Is there any chance you are pregnant?\" \"When was your last menstrual period?\"      denies    Pee was more yellow, and had a bit of a stronger smell    Was trying to confirm office with patient. Pt reports she doesn't want to go to [facility]. Pt wants to go to [facility]. Pt reporting she will decide if she wants to switch. She will get seen at  this weekend.    Protocols used: Vaginal Discharge-Adult-AH    "

## 2025-04-12 NOTE — TELEPHONE ENCOUNTER
"Regarding: Thin White Vag Discharge , Itchy, Urgency to Urinate  ----- Message from Antonella RAE sent at 4/12/2025 11:14 AM EDT -----  \" I think I have a yeast infection, I have a thin white vag discharge and urgency to urinate and a little itchy. I have a HX of BV and UTI's.\"    "

## 2025-05-09 ENCOUNTER — TELEPHONE (OUTPATIENT)
Age: 30
End: 2025-05-09

## 2025-05-09 DIAGNOSIS — Z30.42 ENCOUNTER FOR SURVEILLANCE OF INJECTABLE CONTRACEPTIVE: ICD-10-CM

## 2025-05-09 RX ORDER — MEDROXYPROGESTERONE ACETATE 150 MG/ML
150 INJECTION, SUSPENSION INTRAMUSCULAR
Qty: 1 ML | Refills: 1 | Status: SHIPPED | OUTPATIENT
Start: 2025-05-09

## 2025-05-09 NOTE — TELEPHONE ENCOUNTER
Patient calling she has a depo appt scheduled for 5/19/25 her pharmacy is not able to release her depo medication until 5/27/25. Patient would like to keep her current appt because it is hard for her to take off of work.    Last depo 2/27/25   Range for next 5/15-5/29/25.    [facility] Cyndi

## 2025-05-09 NOTE — TELEPHONE ENCOUNTER
I just tried to refill it for her to see if that works. If they still give her issues, we can try to verbally ask the pharmacy to release this earlier.

## 2025-05-19 ENCOUNTER — CLINICAL SUPPORT (OUTPATIENT)
Dept: OBGYN CLINIC | Facility: CLINIC | Age: 30
End: 2025-05-19

## 2025-05-19 VITALS
DIASTOLIC BLOOD PRESSURE: 80 MMHG | HEIGHT: 62 IN | SYSTOLIC BLOOD PRESSURE: 124 MMHG | BODY MASS INDEX: 36.25 KG/M2 | WEIGHT: 197 LBS

## 2025-05-19 DIAGNOSIS — Z30.42 ENCOUNTER FOR SURVEILLANCE OF INJECTABLE CONTRACEPTIVE: Primary | ICD-10-CM

## 2025-05-19 RX ORDER — PRAZOSIN HYDROCHLORIDE 1 MG/1
CAPSULE ORAL
COMMUNITY
Start: 2025-03-26

## 2025-05-19 RX ORDER — TRAZODONE HYDROCHLORIDE 50 MG/1
50 TABLET ORAL
COMMUNITY
Start: 2025-04-22

## 2025-05-19 RX ORDER — MEDROXYPROGESTERONE ACETATE 150 MG/ML
150 INJECTION, SUSPENSION INTRAMUSCULAR ONCE
Status: COMPLETED | OUTPATIENT
Start: 2025-05-19 | End: 2025-05-19

## 2025-05-19 RX ORDER — ESCITALOPRAM OXALATE 5 MG/1
1 TABLET ORAL DAILY
COMMUNITY
Start: 2025-04-22

## 2025-05-19 RX ADMIN — MEDROXYPROGESTERONE ACETATE 150 MG: 150 INJECTION, SUSPENSION INTRAMUSCULAR at 10:31

## 2025-05-19 NOTE — PROGRESS NOTES
The patient is here for a depo injection in the RIGHT DELT.     The patient had spotting two weeks ago for two days. The patient did have mild cramping.     The patient has no other concerns.     The patient tolerated the injection well.